# Patient Record
Sex: MALE | Race: WHITE | NOT HISPANIC OR LATINO | Employment: UNEMPLOYED | URBAN - METROPOLITAN AREA
[De-identification: names, ages, dates, MRNs, and addresses within clinical notes are randomized per-mention and may not be internally consistent; named-entity substitution may affect disease eponyms.]

---

## 2017-11-07 ENCOUNTER — OFFICE VISIT (OUTPATIENT)
Dept: SURGERY | Facility: CLINIC | Age: 22
End: 2017-11-07
Payer: COMMERCIAL

## 2017-11-07 VITALS
SYSTOLIC BLOOD PRESSURE: 142 MMHG | BODY MASS INDEX: 25.72 KG/M2 | DIASTOLIC BLOOD PRESSURE: 84 MMHG | WEIGHT: 160.06 LBS | HEIGHT: 66 IN

## 2017-11-07 DIAGNOSIS — K64.5 THROMBOSED EXTERNAL HEMORRHOID: Primary | ICD-10-CM

## 2017-11-07 PROCEDURE — 46083 INC THROMBOSED HROID XTRNL: CPT | Mod: ,,, | Performed by: SURGERY

## 2017-11-07 PROCEDURE — 99204 OFFICE O/P NEW MOD 45 MIN: CPT | Mod: 25,,, | Performed by: SURGERY

## 2017-11-07 RX ORDER — KETOCONAZOLE 20 MG/ML
SHAMPOO, SUSPENSION TOPICAL
Refills: 11 | COMMUNITY
Start: 2017-10-11 | End: 2019-02-05

## 2017-11-07 NOTE — PROGRESS NOTES
Subjective:       Patient ID: Yunior Crawford is a 22 y.o. male.    Chief Complaint: Other (Referred by Dr Cardozo to Evaluate 4cm Thrombosed Hemorrhoid)      HPI:    C/o 2d hx painful lump at anus.  No bleeding.  Denies recent constipation/diarrhea.  No prior episodes    HPI     Other    Additional comments: Referred by Dr Cardozo to Evaluate 4cm Thrombosed   Hemorrhoid       Last edited by Julissa Headley MA on 11/7/2017  1:31 PM. (History)        History reviewed. No pertinent past medical history.  Past Surgical History:   Procedure Laterality Date    CLAVICLE SURGERY       Review of patient's allergies indicates:  No Known Allergies  Medication List with Changes/Refills   Current Medications    KETOCONAZOLE (NIZORAL) 2 % SHAMPOO         History reviewed. No pertinent family history.  Social History     Social History    Marital status: Single     Spouse name: N/A    Number of children: N/A    Years of education: N/A     Social History Main Topics    Smoking status: Current Every Day Smoker     Packs/day: 0.50    Smokeless tobacco: Never Used    Alcohol use Yes      Comment: weekends    Drug use: No    Sexual activity: Not Asked     Other Topics Concern    None     Social History Narrative    None         Review of Systems   Constitutional: Negative for appetite change, chills, fever and unexpected weight change.   HENT: Negative for hearing loss, rhinorrhea, sore throat and voice change.    Eyes: Negative for photophobia and visual disturbance.   Respiratory: Negative for cough, choking and shortness of breath.    Cardiovascular: Negative for chest pain, palpitations and leg swelling.   Gastrointestinal: Positive for rectal pain. Negative for abdominal pain, blood in stool, constipation, diarrhea, nausea and vomiting.   Endocrine: Negative for cold intolerance, heat intolerance and polyphagia.   Genitourinary: Negative for dysuria.   Musculoskeletal: Negative for arthralgias and back pain.   Skin:  Negative for color change.   Neurological: Negative for dizziness, seizures, syncope and headaches.   Hematological: Negative for adenopathy. Does not bruise/bleed easily.       Objective:      Physical Exam   Constitutional: He appears well-developed and well-nourished.  Non-toxic appearance. No distress.   HENT:   Head: Normocephalic and atraumatic. Head is without abrasion and without laceration.   Right Ear: External ear normal.   Left Ear: External ear normal.   Nose: Nose normal.   Mouth/Throat: Oropharynx is clear and moist.   Eyes: EOM are normal. Pupils are equal, round, and reactive to light.   Neck: Trachea normal. No tracheal deviation and normal range of motion present. No thyroid mass and no thyromegaly present.   Cardiovascular: Normal rate and regular rhythm.    Pulmonary/Chest: Effort normal. No accessory muscle usage. No tachypnea. No respiratory distress.   Abdominal: Soft. Normal appearance and bowel sounds are normal. He exhibits no distension and no mass. There is no hepatosplenomegaly. There is no tenderness. There is no tenderness at McBurney's point and negative Johnson's sign. No hernia.   Genitourinary: Rectal exam shows external hemorrhoid (large thrombosed, TTP).   Lymphadenopathy:     He has no cervical adenopathy.   Neurological: He is alert. Coordination and gait normal.   Skin: Skin is warm and intact.   Psychiatric: He has a normal mood and affect. His speech is normal and behavior is normal.       Assessment/Plan:   Thrombosed external hemorrhoid  -     INCISION AND DRAINAGE    Other orders  -     Cancel: INCISION AND DRAINAGE      S/p I&D Thrombosed Hem - see sep report  Rec tub soaks until healed  RTO if needed    Return for F/U - As needed.

## 2017-11-07 NOTE — LETTER
December 28, 2017        Nazia Cardozo MD  [address]  [address]  [facility] [address]             Critical access hospital Surgery  [address]  [address]  [place] [address]  Phone: [phone]  Fax: [phone]   Patient: Yunior Crawford   MR Number: [id number]   YOB: 1995   Date of Visit: 11/7/2017       Dear Dr. Cardozo:    Thank you for referring Yunior Crawford to me for evaluation. Below are the relevant portions of my assessment and plan of care.            If you have questions, please do not hesitate to call me. I look forward to following Yunior along with you.    Sincerely,      Stacy Yoo MD           CC  No Recipients

## 2017-11-08 NOTE — PROCEDURES
"Incision & Drainage  Date/Time: 11/7/2017 3:30 PM  Performed by: CARA SCOTT  Authorized by: CARA SCOTT     Time out: Immediately prior to procedure a "time out" was called to verify the correct patient, procedure, equipment, support staff and site/side marked as required.    Consent Done?:  Yes (Written)    Type:  Hematoma  Body area:  Anogenital  Location details:  Perianal  Anesthesia:  Local infiltration  Local anesthetic:  Lidocaine 1% without epinephrinelidocaine 1% with epinephrine  Anesthetic total (ml):  4  Scalpel size:  11  Incision type:  Single straight  Complexity:  Simple  Drainage:  Bloody  Drainage amount:  Moderate  Wound treatment:  Incision, wound left open and clot removal  Packing material:  None  Patient tolerance:  Patient tolerated the procedure well with no immediate complications    Large amount of clot expressed, no active bleeding      "

## 2018-01-15 ENCOUNTER — HOSPITAL ENCOUNTER (EMERGENCY)
Facility: HOSPITAL | Age: 23
Discharge: HOME | End: 2018-01-15
Attending: EMERGENCY MEDICINE
Payer: COMMERCIAL

## 2018-01-15 ENCOUNTER — TELEPHONE (OUTPATIENT)
Dept: ORTHOPEDICS | Facility: CLINIC | Age: 23
End: 2018-01-15

## 2018-01-15 VITALS
OXYGEN SATURATION: 99 % | DIASTOLIC BLOOD PRESSURE: 87 MMHG | WEIGHT: 175.25 LBS | HEART RATE: 96 BPM | RESPIRATION RATE: 16 BRPM | SYSTOLIC BLOOD PRESSURE: 132 MMHG | TEMPERATURE: 98 F | HEIGHT: 69 IN | BODY MASS INDEX: 25.96 KG/M2

## 2018-01-15 DIAGNOSIS — S61.209A FLEXOR TENDON LACERATION OF FINGER WITH OPEN WOUND, INITIAL ENCOUNTER: Primary | ICD-10-CM

## 2018-01-15 DIAGNOSIS — S56.129A FLEXOR TENDON LACERATION OF FINGER WITH OPEN WOUND, INITIAL ENCOUNTER: Primary | ICD-10-CM

## 2018-01-15 PROCEDURE — 12041 INTMD RPR N-HF/GENIT 2.5CM/<: CPT

## 2018-01-15 PROCEDURE — 99283 EMERGENCY DEPT VISIT LOW MDM: CPT | Mod: 25

## 2018-01-15 RX ORDER — LIDOCAINE HYDROCHLORIDE 10 MG/ML
INJECTION, SOLUTION EPIDURAL; INFILTRATION; INTRACAUDAL; PERINEURAL
Status: DISCONTINUED
Start: 2018-01-15 | End: 2018-01-15 | Stop reason: HOSPADM

## 2018-01-15 RX ORDER — IBUPROFEN 800 MG/1
800 TABLET ORAL EVERY 6 HOURS PRN
Qty: 20 TABLET | Refills: 0 | Status: SHIPPED | OUTPATIENT
Start: 2018-01-15 | End: 2019-02-05

## 2018-01-15 RX ORDER — CEPHALEXIN 500 MG/1
500 CAPSULE ORAL 4 TIMES DAILY
Qty: 28 CAPSULE | Refills: 0 | Status: SHIPPED | OUTPATIENT
Start: 2018-01-15 | End: 2018-01-22

## 2018-01-15 NOTE — TELEPHONE ENCOUNTER
Called pt and advised unfortunately Dr. Galindo does not perform sx on lacerated tendons of the fingers. Provided name and number for Dr. Fried, the orthopedic hand specialist. Pt verbalized understanding.

## 2018-01-15 NOTE — TELEPHONE ENCOUNTER
----- Message from Brittani Vazquez sent at 1/15/2018  1:21 PM CST -----  Contact: mother, bharti Crawford  mother, Bharti Crawford - [phone] is calling.  Mom has some  Specific questions  regarding the  Hand.  The patient has  lacerated his tendons in his fingers. And needs surgery.    Mom needs to know if Dr. Galindo  Does this surgery before she schedules an appointment.  Please advise.  Thanks!

## 2018-01-15 NOTE — ED PROVIDER NOTES
Encounter Date: 1/15/2018       History     Chief Complaint   Patient presents with    Laceration     Rt 4th and 5th digit.  Pt unable to bend finger      This patient is a 22-year-old male presenting to the emergency Department with complaints of lacerations of the index and pinky finger on the right hand.  He reports he was washing dishes tonight when a broken plate cut him at the base of these 2 fingers on the palm aspect.  He denies he is able to flex or bend these fingers.  He reports receiving a tetanus injection within the past 10 years.  He reports he is able to extend the fingers normally and has normal sensation.  He does endorse is been drinking alcohol and is a smoker.  He is right-handed.          Review of patient's allergies indicates:  No Known Allergies  History reviewed. No pertinent past medical history.  Past Surgical History:   Procedure Laterality Date    CLAVICLE SURGERY       History reviewed. No pertinent family history.  Social History   Substance Use Topics    Smoking status: Current Every Day Smoker     Packs/day: 0.50    Smokeless tobacco: Never Used    Alcohol use Yes      Comment: weekends     Review of Systems   Constitutional: Negative for fever.   Respiratory: Negative for shortness of breath.    Cardiovascular: Negative for chest pain.   Gastrointestinal: Negative for abdominal pain.   Musculoskeletal: Positive for myalgias.   Skin: Positive for wound.   Neurological: Positive for weakness.   Hematological: Does not bruise/bleed easily.       Physical Exam     Initial Vitals [01/15/18 0110]   BP Pulse Resp Temp SpO2   132/87 96 16 97.6 °F (36.4 °C) 99 %      MAP       102         Physical Exam    Nursing note and vitals reviewed.  Constitutional: He appears well-nourished. No distress.   Age-appropriate male seen in no acute distress, smells of alcohol   HENT:   Head: Normocephalic.   Eyes: Conjunctivae and EOM are normal.   Neck: Normal range of motion. Neck supple.    Cardiovascular: Normal rate and regular rhythm.   Pulmonary/Chest: No respiratory distress. He has no wheezes.   Abdominal: He exhibits no distension.   Musculoskeletal: He exhibits tenderness. He exhibits no edema.   1.5 similar laceration of the palmar aspect of the proximal right fifth digit, no flexion at the DIP, PIP, MCP, 0.5 cm laceration of the proximal fourth digit, no flexion at the PIP, MCP   Neurological: He is alert and oriented to person, place, and time. No sensory deficit.   Fully intact sensation of the right hand, including the lacerated fingers   Skin: Skin is warm and dry.   Psychiatric: He has a normal mood and affect. Thought content normal.         ED Course   Lac Repair  Date/Time: 1/15/2018 6:30 AM  Performed by: JAROD RIVERA  Authorized by: JAROD RIVERA   Body area: upper extremity  Location details: right hand  Laceration length: 2 cm  Foreign bodies: no foreign bodies  Tendon involvement: complex  Nerve involvement: none  Vascular damage: no  Anesthesia: local infiltration    Anesthesia:  Local Anesthetic: lidocaine 1% without epinephrine  Anesthetic total: 6 mL  Patient sedated: no  Preparation: Patient was prepped and draped in the usual sterile fashion.  Irrigation solution: saline  Irrigation method: syringe  Amount of cleaning: extensive  Debridement: none  Degree of undermining: none  Skin closure: Ethilon  Number of sutures: 5  Technique: simple  Approximation: close  Approximation difficulty: simple  Dressing: antibiotic ointment and splint  Patient tolerance: Patient tolerated the procedure well with no immediate complications        Labs Reviewed - No data to display          Medical Decision Making:   Initial Assessment:   This patient was interviewed and examined.  He has been drinking alcohol but answers appropriately and with normal alertness.  Extensive wound exploration reveals no foreign body.  Concern for complete laceration of the flexor digitorum profundus  and superficialis of the fourth and fifth digit.  Patient had the skin closed and was splinted in slight extension.  He was instructed not to take the splint off until he is assessed by a hand specialist.  He was also educated that evaluation should occur as soon as possible in hopes of securing rapid tendon repair in the OR.  He is asked follow-up at [facility] for this as soon as possible.  He will be discharged with a prescription for Keflex and Naprosyn.  Additionally asked to return to the ER for any new, concerning, or worsening symptoms.  ED Management:  Patient agreeable with this plan for follow-up and he was discharged in stable condition.                   ED Course      Clinical Impression:   The encounter diagnosis was Flexor tendon laceration of finger with open wound, initial encounter.    Disposition:   Disposition: Discharged  Condition: Stable                        Castillo Diaz MD  01/15/18 0635

## 2018-01-15 NOTE — ED NOTES
"Patient here with lacerations to right 4th and 5th fingers. Was washing dishes "vigerously" and plate broke and cut fingers. 1/4" puncture wound to 4th finger and 1/2" laceration to 5th; unable to flex those fingers, can extend them  "

## 2018-01-15 NOTE — TELEPHONE ENCOUNTER
----- Message from Syeda Menon sent at 1/15/2018  2:54 PM CST -----  Contact: Kali Aden Qbsqdr-[phone]   Pt's father called stating the pt injured the tendons in his Rt hand fingers and was told by the doctor in the Emergency room the pt needs to follow up with Dr. Fried so he can have surgery on his hand. The next available appt was in February and the pt's father is requesting the pt to be seen much sooner than that.  Please call father today regarding this [phone]

## 2018-01-16 ENCOUNTER — NURSE TRIAGE (OUTPATIENT)
Dept: ADMINISTRATIVE | Facility: CLINIC | Age: 23
End: 2018-01-16

## 2018-01-16 ENCOUNTER — TELEPHONE (OUTPATIENT)
Dept: ORTHOPEDICS | Facility: CLINIC | Age: 23
End: 2018-01-16

## 2018-01-16 NOTE — TELEPHONE ENCOUNTER
I spoke with the patients mother and informed her that we can not give out medical advise to a new patient. She understood.

## 2018-01-16 NOTE — TELEPHONE ENCOUNTER
Attempted to call pt's mom about questions for wound care. No answer noted. ML with name and contact info.

## 2018-01-16 NOTE — TELEPHONE ENCOUNTER
"Contacted patient father. No answer. Contacted again informed father I was returning phone call from 's office. He yelled at me and stated well you should have that on your caller ID. Advised him that patient has appointment scheduled for Thursday and made aware of time and location. He then stated " We'll be there but don't call again". Verbalized understanding.    ----- Message from Julissa Winter sent at 1/16/2018 10:38 AM CST -----  Contact: Kali His Father / [phone]  Kali the patient father returned your call. Please advise      "

## 2018-01-16 NOTE — TELEPHONE ENCOUNTER
"    Reason for Disposition   [1] Caller requesting NON-URGENT health information AND [2] PCP's office is the best resource    Answer Assessment - Initial Assessment Questions  1. REASON FOR CALL or QUESTION: "What is your reason for calling today?" or "How can I best help you?" or "What question do you have that I can help answer?"      Patient's mother Mrs. Crawford is requesting instructions for wound care for patient's finger injury.    Protocols used: ST INFORMATION ONLY CALL-A-AH      "

## 2018-01-16 NOTE — TELEPHONE ENCOUNTER
----- Message from Mary Mackay sent at 1/16/2018 11:14 AM CST -----  Contact: Self [phone] or [phone]  Patient Returning Your Phone Call

## 2018-01-17 ENCOUNTER — TELEPHONE (OUTPATIENT)
Dept: ORTHOPEDICS | Facility: CLINIC | Age: 23
End: 2018-01-17

## 2018-01-17 NOTE — TELEPHONE ENCOUNTER
----- Message from Mary Mackay sent at 1/17/2018  9:38 AM CST -----  Contact: Self [phone]  Patient is calling to talk to nurse concerning to see if the doctor will still be having clinic tomorrow. Please advice

## 2018-01-17 NOTE — TELEPHONE ENCOUNTER
Spoke with pt's mother informing Dr. Fried's office should be open on tomorrow morning. Understanding verbalized.

## 2018-01-18 ENCOUNTER — TELEPHONE (OUTPATIENT)
Dept: ORTHOPEDICS | Facility: CLINIC | Age: 23
End: 2018-01-18

## 2018-01-18 ENCOUNTER — OFFICE VISIT (OUTPATIENT)
Dept: ORTHOPEDICS | Facility: CLINIC | Age: 23
End: 2018-01-18
Payer: COMMERCIAL

## 2018-01-18 VITALS — HEIGHT: 69 IN | BODY MASS INDEX: 25.92 KG/M2 | WEIGHT: 175 LBS

## 2018-01-18 DIAGNOSIS — S61.401A FLEXOR TENDON LACERATION OF HAND WITH OPEN WOUND, RIGHT, INITIAL ENCOUNTER: ICD-10-CM

## 2018-01-18 DIAGNOSIS — S66.821A FLEXOR TENDON LACERATION OF HAND WITH OPEN WOUND, RIGHT, INITIAL ENCOUNTER: ICD-10-CM

## 2018-01-18 PROCEDURE — 99204 OFFICE O/P NEW MOD 45 MIN: CPT | Mod: S$GLB,,, | Performed by: ORTHOPAEDIC SURGERY

## 2018-01-18 PROCEDURE — 99999 PR PBB SHADOW E&M-EST. PATIENT-LVL III: CPT | Mod: PBBFAC,,, | Performed by: ORTHOPAEDIC SURGERY

## 2018-01-18 RX ORDER — HYDROCODONE BITARTRATE AND ACETAMINOPHEN 5; 325 MG/1; MG/1
1 TABLET ORAL EVERY 4 HOURS PRN
Qty: 30 TABLET | Refills: 0 | Status: SHIPPED | OUTPATIENT
Start: 2018-01-18 | End: 2018-01-28

## 2018-01-18 NOTE — TELEPHONE ENCOUNTER
I spoke with the patient and informed him that we needed him at the hospital for 8am on tomorrow. He was made aware of date, time and location.

## 2018-01-18 NOTE — TELEPHONE ENCOUNTER
Called the numbers provided, one was the incorrect number and another I did not get an answer and was unable to leave a message.

## 2018-01-18 NOTE — PROGRESS NOTES
CHIEF COMPLAINT:  Laceration, right ring and small fingers.    HISTORY OF PRESENT ILLNESS:  A 22-year-old male, sustained laceration to his   right hand four days ago.  He was seen in the emergency room, noted to have   lacerations to the right ring and small fingers involving the tendon, possible   nerve, referred for treatment.  The wounds were closed loosely.    The patient complaining of numbness in the right small finger and limited   movement of the ring and small fingers.    PAST MEDICAL HISTORY:  Unremarkable.    PAST SURGICAL HISTORY:  Includes clavicle surgery.    FAMILY HISTORY:  Negative.    SOCIAL HISTORY:  The patient smokes daily half a pack per day.  Drinks alcohol   on the weekends.    REVIEW OF SYSTEMS:  Negative for fever, chills and rashes.    CURRENT MEDICATIONS:  Reviewed on chart.    ALLERGIES:  None.    PHYSICAL EXAMINATION:  GENERAL:  A well-developed and well-nourished male, in no acute distress, alert   and oriented x3.  HEENT:  Unremarkable.  LUNGS:  Clear to auscultation.  HEART:  Regular rate and rhythm.  ABDOMEN:  Soft and nontender.  EXTREMITIES:  Significant for the right hand demonstrating volar lacerations   near the base of the right ring and small finger.  The small finger has more   extensive laceration, the ring finger almost a puncture wound.  Sutures are in   place for both.  Both fingers are in extended position with no active flexion   possible at the PIP or DIP joints.  There is no evidence of infection.  There is   some bleeding from the small finger.    There is also decreased sensation.  There is decreased sensation in the right   small finger with complete loss distally to the laceration site.  The ring   finger has sensation to be intact.    IMPRESSION:  1.  Flexor tendon laceration x2, right small finger and right ring finger.  2.  Digital nerve laceration x2, right small finger.    PLAN:  I explained the nature of the injury to the patient, recommended surgical    treatment to include a flexor tendon and nerve repair to the ring and small   fingers.  The risks and benefits of surgery were explained to the patient as   well as the need for postop therapy and immobilization and the incomplete nature   of recovery of nerve repairs.  He understands.  He is currently on Keflex.  I   will continue him with the Keflex until surgery next week.  In the meantime,   local wound care, light activities and he was given an ulnar gutter splint for   protection.      SUNG  dd: 01/18/2018 08:33:06 (CST)  td: 01/18/2018 10:10:08 (CST)  Doc ID   #[id number]  Job ID #[id number]    CC:

## 2018-01-18 NOTE — TELEPHONE ENCOUNTER
----- Message from Mona Nuñez sent at 1/18/2018  3:51 PM CST -----  Contact: [phone]/Mother Bharti  Patient's mother is requesting a call back regarding authorization for his procedure at [facility]. Insurance offices are closed today and the surgery is tomorrow. No surgery without authorization. Please call and advise.

## 2018-01-18 NOTE — LETTER
January 18, 2018        Nazia Cardozo MD  [address]  [address]  [facility] [address]             [facility]  [address] [place] [address] [address]  [facility] [address]  Phone: [phone]   Patient: Yunior Crawford   MR Number: [id number]   YOB: 1995   Date of Visit: 1/18/2018       Dear Dr. Cardozo:    Thank you for referring Yunior Crawford to me for evaluation. Below are the relevant portions of my assessment and plan of care.            If you have questions, please do not hesitate to call me. I look forward to following Yunior along with you.    Sincerely,      Kennedy Fried Jr., MD           CC  No Recipients

## 2018-01-18 NOTE — TELEPHONE ENCOUNTER
----- Message from Jenna Schroeder sent at 1/18/2018 10:33 AM CST -----  Contact: mother Hay. [phone] or [phone]  Requests to speak with you regarding insurance pre-authorization for patient's surgery.

## 2018-01-18 NOTE — TELEPHONE ENCOUNTER
The patients mother insisted on rescheduling surgery due to auth. She called her insurance company and they were closed due to the weather. Per dr. Fried we pushed the surgery back to give our auth department time to get auth. She understood and was made aware of new time and location.

## 2018-01-19 ENCOUNTER — ANESTHESIA EVENT (OUTPATIENT)
Dept: SURGERY | Facility: OTHER | Age: 23
End: 2018-01-19

## 2018-01-19 ENCOUNTER — ANESTHESIA (OUTPATIENT)
Dept: SURGERY | Facility: OTHER | Age: 23
End: 2018-01-19

## 2018-01-19 ENCOUNTER — TELEPHONE (OUTPATIENT)
Dept: ORTHOPEDICS | Facility: CLINIC | Age: 23
End: 2018-01-19

## 2018-01-19 NOTE — TELEPHONE ENCOUNTER
Spoke with pt's mother, informed auth dept was called and they will start working on it. Understanding verbalized. Stated will be there for surgery today.

## 2018-01-20 ENCOUNTER — ANESTHESIA EVENT (OUTPATIENT)
Dept: SURGERY | Facility: OTHER | Age: 23
End: 2018-01-20
Payer: COMMERCIAL

## 2018-01-20 ENCOUNTER — SURGERY (OUTPATIENT)
Age: 23
End: 2018-01-20

## 2018-01-20 ENCOUNTER — HOSPITAL ENCOUNTER (OUTPATIENT)
Facility: OTHER | Age: 23
Discharge: HOME | End: 2018-01-20
Attending: ORTHOPAEDIC SURGERY | Admitting: ORTHOPAEDIC SURGERY
Payer: COMMERCIAL

## 2018-01-20 ENCOUNTER — ANESTHESIA (OUTPATIENT)
Dept: SURGERY | Facility: OTHER | Age: 23
End: 2018-01-20
Payer: COMMERCIAL

## 2018-01-20 VITALS
HEART RATE: 69 BPM | OXYGEN SATURATION: 100 % | TEMPERATURE: 98 F | RESPIRATION RATE: 16 BRPM | SYSTOLIC BLOOD PRESSURE: 127 MMHG | DIASTOLIC BLOOD PRESSURE: 82 MMHG

## 2018-01-20 DIAGNOSIS — S66.821A FLEXOR TENDON LACERATION OF HAND WITH OPEN WOUND, RIGHT, INITIAL ENCOUNTER: ICD-10-CM

## 2018-01-20 DIAGNOSIS — S61.401A FLEXOR TENDON LACERATION OF HAND WITH OPEN WOUND, RIGHT, INITIAL ENCOUNTER: ICD-10-CM

## 2018-01-20 PROCEDURE — 37000008 HC ANESTHESIA 1ST 15 MINUTES: Performed by: ORTHOPAEDIC SURGERY

## 2018-01-20 PROCEDURE — 26356 REPAIR FINGER/HAND TENDON: CPT | Mod: 51,F9,, | Performed by: ORTHOPAEDIC SURGERY

## 2018-01-20 PROCEDURE — 25000003 PHARM REV CODE 250: Performed by: ANESTHESIOLOGY

## 2018-01-20 PROCEDURE — 36000706: Performed by: ORTHOPAEDIC SURGERY

## 2018-01-20 PROCEDURE — 37000009 HC ANESTHESIA EA ADD 15 MINS: Performed by: ORTHOPAEDIC SURGERY

## 2018-01-20 PROCEDURE — 25000003 PHARM REV CODE 250: Performed by: ORTHOPAEDIC SURGERY

## 2018-01-20 PROCEDURE — 64831 REPAIR OF DIGIT NERVE: CPT | Mod: 51,F9,, | Performed by: ORTHOPAEDIC SURGERY

## 2018-01-20 PROCEDURE — 63600175 PHARM REV CODE 636 W HCPCS: Performed by: NURSE ANESTHETIST, CERTIFIED REGISTERED

## 2018-01-20 PROCEDURE — 63600175 PHARM REV CODE 636 W HCPCS: Performed by: ANESTHESIOLOGY

## 2018-01-20 PROCEDURE — 36000707: Performed by: ORTHOPAEDIC SURGERY

## 2018-01-20 PROCEDURE — 63600175 PHARM REV CODE 636 W HCPCS: Performed by: ORTHOPAEDIC SURGERY

## 2018-01-20 PROCEDURE — 71000033 HC RECOVERY, INTIAL HOUR: Performed by: ORTHOPAEDIC SURGERY

## 2018-01-20 RX ORDER — CEFAZOLIN SODIUM 2 G/50ML
2 SOLUTION INTRAVENOUS
Status: DISCONTINUED | OUTPATIENT
Start: 2018-01-20 | End: 2018-01-22 | Stop reason: HOSPADM

## 2018-01-20 RX ORDER — OXYCODONE AND ACETAMINOPHEN 7.5; 325 MG/1; MG/1
1 TABLET ORAL EVERY 4 HOURS PRN
Qty: 40 TABLET | Refills: 0 | Status: SHIPPED | OUTPATIENT
Start: 2018-01-20 | End: 2018-02-01 | Stop reason: SDUPTHER

## 2018-01-20 RX ORDER — SODIUM CHLORIDE, SODIUM LACTATE, POTASSIUM CHLORIDE, CALCIUM CHLORIDE 600; 310; 30; 20 MG/100ML; MG/100ML; MG/100ML; MG/100ML
INJECTION, SOLUTION INTRAVENOUS CONTINUOUS PRN
Status: DISCONTINUED | OUTPATIENT
Start: 2018-01-20 | End: 2018-01-20

## 2018-01-20 RX ORDER — CEPHALEXIN 500 MG/1
500 CAPSULE ORAL 4 TIMES DAILY
Qty: 20 CAPSULE | Refills: 0 | Status: SHIPPED | OUTPATIENT
Start: 2018-01-20 | End: 2018-02-26

## 2018-01-20 RX ORDER — ONDANSETRON 2 MG/ML
INJECTION INTRAMUSCULAR; INTRAVENOUS
Status: DISCONTINUED | OUTPATIENT
Start: 2018-01-20 | End: 2018-01-20

## 2018-01-20 RX ORDER — ACETAMINOPHEN 325 MG/1
650 TABLET ORAL EVERY 4 HOURS PRN
Status: DISCONTINUED | OUTPATIENT
Start: 2018-01-20 | End: 2018-01-22 | Stop reason: HOSPADM

## 2018-01-20 RX ORDER — SODIUM CHLORIDE 0.9 % (FLUSH) 0.9 %
3 SYRINGE (ML) INJECTION
Status: DISCONTINUED | OUTPATIENT
Start: 2018-01-20 | End: 2018-01-22 | Stop reason: HOSPADM

## 2018-01-20 RX ORDER — FENTANYL CITRATE 50 UG/ML
INJECTION, SOLUTION INTRAMUSCULAR; INTRAVENOUS
Status: DISCONTINUED | OUTPATIENT
Start: 2018-01-20 | End: 2018-01-20

## 2018-01-20 RX ORDER — ONDANSETRON 2 MG/ML
4 INJECTION INTRAMUSCULAR; INTRAVENOUS DAILY PRN
Status: DISCONTINUED | OUTPATIENT
Start: 2018-01-20 | End: 2018-01-22 | Stop reason: HOSPADM

## 2018-01-20 RX ORDER — BACITRACIN 50000 [IU]/1
INJECTION, POWDER, FOR SOLUTION INTRAMUSCULAR
Status: DISCONTINUED | OUTPATIENT
Start: 2018-01-20 | End: 2018-01-20 | Stop reason: HOSPADM

## 2018-01-20 RX ORDER — MIDAZOLAM HYDROCHLORIDE 1 MG/ML
INJECTION INTRAMUSCULAR; INTRAVENOUS
Status: DISCONTINUED | OUTPATIENT
Start: 2018-01-20 | End: 2018-01-20

## 2018-01-20 RX ORDER — PROPOFOL 10 MG/ML
VIAL (ML) INTRAVENOUS
Status: DISCONTINUED | OUTPATIENT
Start: 2018-01-20 | End: 2018-01-20

## 2018-01-20 RX ORDER — KETOROLAC TROMETHAMINE 30 MG/ML
30 INJECTION, SOLUTION INTRAMUSCULAR; INTRAVENOUS ONCE
Status: COMPLETED | OUTPATIENT
Start: 2018-01-20 | End: 2018-01-20

## 2018-01-20 RX ORDER — ONDANSETRON 8 MG/1
8 TABLET, ORALLY DISINTEGRATING ORAL EVERY 8 HOURS PRN
Status: DISCONTINUED | OUTPATIENT
Start: 2018-01-20 | End: 2018-01-22 | Stop reason: HOSPADM

## 2018-01-20 RX ORDER — MEPERIDINE HYDROCHLORIDE 50 MG/ML
12.5 INJECTION INTRAMUSCULAR; INTRAVENOUS; SUBCUTANEOUS ONCE AS NEEDED
Status: ACTIVE | OUTPATIENT
Start: 2018-01-20 | End: 2018-01-20

## 2018-01-20 RX ORDER — OXYCODONE HYDROCHLORIDE 5 MG/1
10 TABLET ORAL EVERY 4 HOURS PRN
Status: DISCONTINUED | OUTPATIENT
Start: 2018-01-20 | End: 2018-01-22 | Stop reason: HOSPADM

## 2018-01-20 RX ORDER — HYDROMORPHONE HYDROCHLORIDE 2 MG/ML
0.4 INJECTION, SOLUTION INTRAMUSCULAR; INTRAVENOUS; SUBCUTANEOUS EVERY 5 MIN PRN
Status: DISCONTINUED | OUTPATIENT
Start: 2018-01-20 | End: 2018-01-22 | Stop reason: HOSPADM

## 2018-01-20 RX ORDER — ROPIVACAINE HYDROCHLORIDE 5 MG/ML
INJECTION, SOLUTION EPIDURAL; INFILTRATION; PERINEURAL
Status: DISCONTINUED | OUTPATIENT
Start: 2018-01-20 | End: 2018-01-20

## 2018-01-20 RX ORDER — LIDOCAINE HCL/PF 100 MG/5ML
SYRINGE (ML) INTRAVENOUS
Status: DISCONTINUED | OUTPATIENT
Start: 2018-01-20 | End: 2018-01-20

## 2018-01-20 RX ORDER — ACETAMINOPHEN 10 MG/ML
INJECTION, SOLUTION INTRAVENOUS
Status: DISCONTINUED | OUTPATIENT
Start: 2018-01-20 | End: 2018-01-20

## 2018-01-20 RX ORDER — OXYCODONE HYDROCHLORIDE 5 MG/1
5 TABLET ORAL
Status: DISCONTINUED | OUTPATIENT
Start: 2018-01-20 | End: 2018-01-22 | Stop reason: HOSPADM

## 2018-01-20 RX ORDER — FENTANYL CITRATE 50 UG/ML
25 INJECTION, SOLUTION INTRAMUSCULAR; INTRAVENOUS EVERY 5 MIN PRN
Status: DISCONTINUED | OUTPATIENT
Start: 2018-01-20 | End: 2018-01-22 | Stop reason: HOSPADM

## 2018-01-20 RX ORDER — PROPOFOL 10 MG/ML
VIAL (ML) INTRAVENOUS CONTINUOUS PRN
Status: DISCONTINUED | OUTPATIENT
Start: 2018-01-20 | End: 2018-01-20

## 2018-01-20 RX ADMIN — ROPIVACAINE HYDROCHLORIDE 30 ML: 5 INJECTION, SOLUTION EPIDURAL; INFILTRATION; PERINEURAL at 09:01

## 2018-01-20 RX ADMIN — PROPOFOL 50 MG: 10 INJECTION, EMULSION INTRAVENOUS at 09:01

## 2018-01-20 RX ADMIN — MIDAZOLAM HYDROCHLORIDE 2 MG: 1 INJECTION, SOLUTION INTRAMUSCULAR; INTRAVENOUS at 09:01

## 2018-01-20 RX ADMIN — ACETAMINOPHEN 1000 MG: 10 INJECTION, SOLUTION INTRAVENOUS at 09:01

## 2018-01-20 RX ADMIN — FENTANYL CITRATE 100 MCG: 50 INJECTION, SOLUTION INTRAMUSCULAR; INTRAVENOUS at 09:01

## 2018-01-20 RX ADMIN — ONDANSETRON 4 MG: 2 INJECTION INTRAMUSCULAR; INTRAVENOUS at 09:01

## 2018-01-20 RX ADMIN — BACITRACIN 50000 UNITS: 50000 INJECTION, POWDER, FOR SOLUTION INTRAMUSCULAR at 10:01

## 2018-01-20 RX ADMIN — OXYCODONE HYDROCHLORIDE 5 MG: 5 TABLET ORAL at 12:01

## 2018-01-20 RX ADMIN — LIDOCAINE HYDROCHLORIDE 75 MG: 20 INJECTION, SOLUTION INTRAVENOUS at 09:01

## 2018-01-20 RX ADMIN — PROPOFOL 100 MCG/KG/MIN: 10 INJECTION, EMULSION INTRAVENOUS at 09:01

## 2018-01-20 RX ADMIN — KETOROLAC TROMETHAMINE 30 MG: 30 INJECTION, SOLUTION INTRAMUSCULAR at 12:01

## 2018-01-20 RX ADMIN — CEFAZOLIN SODIUM 2 G: 2 SOLUTION INTRAVENOUS at 09:01

## 2018-01-20 RX ADMIN — SODIUM CHLORIDE, SODIUM LACTATE, POTASSIUM CHLORIDE, AND CALCIUM CHLORIDE: 600; 310; 30; 20 INJECTION, SOLUTION INTRAVENOUS at 09:01

## 2018-01-20 NOTE — OP NOTE
DATE OF PROCEDURE:  01/20/2018.    PREOPERATIVE DIAGNOSES:  1.  Flexor tendon laceration x2, right ring finger (flexor digitorum profundus,   flexor digitorum superficialis).  2.  Flexor tendon laceration x2, right small finger (flexor digitorum profundus,   flexor digitorum superficialis).  3.  Ulnar digital nerve laceration, right small finger.    POSTOPERATIVE DIAGNOSES:  1.  Flexor tendon laceration x2, right ring finger (flexor digitorum profundus,   flexor digitorum superficialis).  2.  Flexor tendon laceration x2, right small finger (flexor digitorum profundus,   flexor digitorum superficialis).  3.  Ulnar digital nerve laceration, right small finger.    OPERATIVE PROCEDURES:  1.  Flexor tendon repair x2, right ring finger, zone 2 (FDP, FDS tendon).  2.  Flexor tendon repair x2, right small finger zone 2 (FDP, FDS tendon).  3.  Repair of ulnar digital nerve, right small finger using micro instruments   and 8-0 nylon.    SURGEON:  Dr. Kennedy Fried.    ANESTHESIA:  Regional block.    ESTIMATED BLOOD LOSS:  Minimal.    COMPLICATIONS:  None.    SPECIMENS:  None.    BRIEF INDICATIONS:  This is a 22-year-old male who sustained laceration to his   right ring and small finger with tendon and nerve injury, taken to Surgery for   the above procedure.    OPERATIVE PROCEDURE IN DETAIL:  After operative consent was obtained, the   patient brought to the Operating Room, placed supine on the operating room   table.  Anesthesia by regional block was performed by the Anesthesia staff.    After the right arm was fully anesthetized, the tourniquet applied to the arm   and the right upper extremity prepped and draped out in the normal sterile   fashion.  The Esmarch used to exsanguinate the limb and the tourniquet inflated   to 225 mmHg.    Sutures were removed from both fingers at the laceration site volar at the PIP   joint.  Sutures were removed.  The wounds were opened and irrigated thoroughly.    The ring finger was  approached first with a zigzag extension using skin creases   in Reinier incisions with a #15 blade.  Full-thickness skin flaps were elevated.    Hemostasis achieved with the Bovie.  The nerves were intact in the ring   finger.  Both tendons were cut and retracted to the level of the A1 pulley.  A   tendon grabber was used to retrieve the two tendons, which were then brought   into the wound, irrigated thoroughly and the ends cleaned up a bit.  The FDS was   repaired first using several 2 or 3 horizontal mattress sutures using 4-0   FiberWire.  Prolene used to bury the knots.  In a similar fashion, the FDP   tendon was repaired in a first modified Garcia suture of 4-0 FiberWire   augmented with a horizontal mattress on each side and 6-0 Prolene to bury the   knots.  Good repair was achieved for both, wound thoroughly irrigated with   antibiotic saline solution.  Hemostasis achieved with the Bovie and the skin   closed with running and interrupted 5-0 nylon horizontal mattress suture.    Attention was then turned to the right small finger where the initial incision   was extended proximally and distally on each side and utilizing skin creases.    In this case, there was an ulnar digital nerve laceration, so the ulnar digital   nerve was exposed on both sides.  Full thickness skin flaps were elevated,   hemostasis achieved with the Bovie.  The 2 flexor tendons were retracted into   the palm, so the skin incision had to be extended into the distal palm where   they were retrieved at the level of the A1 pulley then passed underneath the A2   pulley delivered into the initial area of injury and then repaired.  In a   similar fashion with horizontal mattress sutures for the FDS using 4-0 FiberWire   and modified Garcia augmented with horizontal mattress sutures x2 on the FDP   and then suture knots buried with 6-0 Prolene.  Good repair was achieved for   both, the A3 pulley left open, the A2 and A4 pulleys both intact.   The wound   irrigated thoroughly.  Attention was then turned to the ulnar digital nerve,   which was repaired with 8-0 nylon epineurial sutures circumferentially.  Good   repair achieved.  The wound irrigated and closed using interrupted and running   5-0 nylon horizontal mattress suture.  Sterile dressing was applied followed by   a well-padded dorsal durham splint with the fingers flexed down.  Tourniquet   deflated.  The patient was brought to Recovery Room in stable condition.  All   sponge and needle counts reported as correct.  No complications.      MIKE/JASON  dd: 01/20/2018 12:19:14 (CST)  td: 01/20/2018 13:04:37 (CST)  Doc ID   #[id number]  Job ID #[id number]    CC:

## 2018-01-20 NOTE — INTERVAL H&P NOTE
The patient has been examined and the H&P has been reviewed:    I concur with the findings and no changes have occurred since H&P was written.    Anesthesia/Surgery risks, benefits and alternative options discussed and understood by patient/family.          Active Hospital Problems    Diagnosis  POA    Flexor tendon laceration of hand with open wound, right, initial encounter [S37.920F, J41.460K]  Yes      Resolved Hospital Problems    Diagnosis Date Resolved POA   No resolved problems to display.

## 2018-01-20 NOTE — ANESTHESIA POSTPROCEDURE EVALUATION
Anesthesia Post Evaluation    Patient: Yunior Crawford    Procedure(s) Performed: Procedure(s) (LRB):  REPAIR-FLEXOR TENDON - right -  ring and small finger (Right)  REPAIR-NERVE-FINGER- right  small finger (Right)    Final Anesthesia Type: regional  Patient location during evaluation: PACU  Patient participation: Yes- Able to Participate  Level of consciousness: awake and alert  Post-procedure vital signs: reviewed and stable  Pain management: adequate  Airway patency: patent  PONV status at discharge: No PONV  Anesthetic complications: no      Cardiovascular status: blood pressure returned to baseline  Respiratory status: unassisted and spontaneous ventilation  Hydration status: euvolemic  Follow-up not needed.        Visit Vitals  /82   Pulse 69   Temp 36.7 °C (98 °F) (Oral)   Resp 16   SpO2 100%       Pain/Amanda Score: Pain Assessment Performed: Yes (1/20/2018 12:11 PM)  Presence of Pain: denies (1/20/2018 12:44 PM)  Pain Rating Prior to Med Admin: 0 (1/20/2018 12:44 PM)  Pain Rating Post Med Admin: 0 (1/20/2018 12:44 PM)  Amanda Score: 8 (1/20/2018 12:11 PM)

## 2018-01-20 NOTE — ANESTHESIA PREPROCEDURE EVALUATION
01/20/2018  Yunior Crawford is a 22 y.o., male.    Anesthesia Evaluation    I have reviewed the Patient Summary Reports.    I have reviewed the Nursing Notes.   I have reviewed the Medications.     Review of Systems  Anesthesia Hx:  Denies Family Hx of Anesthesia complications.   Denies Personal Hx of Anesthesia complications.   Social:  Smoker    Hematology/Oncology:  Hematology Normal   Oncology Normal     EENT/Dental:EENT/Dental Normal   Cardiovascular:  Cardiovascular Normal     Pulmonary:  Pulmonary Normal    Renal/:  Renal/ Normal     Hepatic/GI:  Hepatic/GI Normal    Musculoskeletal:  Musculoskeletal Normal    Neurological:  Neurology Normal    Endocrine:  Endocrine Normal    Dermatological:  Skin Normal    Psych:  Psychiatric Normal           Physical Exam  General:  Well nourished    Airway/Jaw/Neck:  Airway Findings: Mouth Opening: Normal Tongue: Normal  General Airway Assessment: Adult  Mallampati: II  TM Distance: Normal, at least 6 cm      Dental:  Dental Findings: In tact, molar caps, upper front caps        Mental Status:  Mental Status Findings:  Alert and Oriented, Cooperative         Anesthesia Plan  Type of Anesthesia, risks & benefits discussed:  Anesthesia Type:  regional  Patient's Preference:   Intra-op Monitoring Plan: standard ASA monitors  Intra-op Monitoring Plan Comments:   Post Op Pain Control Plan:   Post Op Pain Control Plan Comments:   Induction:    Beta Blocker:         Informed Consent: Patient understands risks and agrees with Anesthesia plan.  Questions answered. Anesthesia consent signed with patient.  ASA Score: 1     Day of Surgery Review of History & Physical:    H&P update referred to the surgeon.         Ready For Surgery From Anesthesia Perspective.

## 2018-01-20 NOTE — ANESTHESIA PROCEDURE NOTES
Peripheral    Patient location during procedure: holding area    Reason for block: primary anesthetic   Diagnosis: laceration of finger    End time: 1/20/2018 9:16 AM  Staffing  Anesthesiologist: ZACK ZARAGOZA  Preanesthetic Checklist  Completed: patient identified, site marked, surgical consent, pre-op evaluation, timeout performed, IV checked, risks and benefits discussed and monitors and equipment checked  Peripheral Block  Patient position: supine  Prep: ChloraPrep  Patient monitoring: heart rate, cardiac monitor, continuous pulse ox and frequent blood pressure checks  Block type: axillary  Laterality: right  Injection technique: single shot  Needle  Needle type: Stimuplex   Needle gauge: 22 G  Needle length: 4 in  Needle localization: nerve stimulator and ultrasound guidance   -ultrasound image captured on disc.  Assessment  Injection assessment: negative aspiration, negative parasthesia and local visualized surrounding nerve  Paresthesia pain: none  Slow fractionated injection: yes  Medications:  Bolus administered: 30 mL of 0.5 ropivacaine  Epinephrine added: none

## 2018-01-20 NOTE — BRIEF OP NOTE
[facility]  Brief Operative Note     SUMMARY     Surgery Date: 1/20/2018     Surgeon(s) and Role:     * Kennedy Fried Jr., MD - Primary    Assisting Surgeon: None    Pre-op Diagnosis:  Flexor tendon laceration of right hand with open wound, initial encounter [S66.821A, S61.409A]  Open wound of hand except fingers without complication [S61.409A]    Post-op Diagnosis:  Post-Op Diagnosis Codes:     * Flexor tendon laceration of right hand with open wound, initial encounter [S66.821A, S61.409A]     * Open wound of hand except fingers without complication [S61.409A]    Procedure(s) (LRB):  REPAIR-FLEXOR TENDON - right -  ring and small finger (Right)  REPAIR-NERVE-FINGER- right  small finger (Right)    Anesthesia: General    Description of the findings of the procedure: tendon and nerve repair right hand    Findings/Key Components: tendon and nerve lac right hand    Estimated Blood Loss: * No values recorded between 1/20/2018  9:58 AM and 1/20/2018 12:13 PM *         Specimens:   Specimen (12h ago through future)    None          Discharge Note    SUMMARY     Admit Date: 1/20/2018    Discharge Date and Time:  01/20/2018 12:14 PM    Hospital Course (synopsis of major diagnoses, care, treatment, and services provided during the course of the hospital stay): see op note     Final Diagnosis: Post-Op Diagnosis Codes:     * Flexor tendon laceration of right hand with open wound, initial encounter [S66.821A, S61.409A]     * Open wound of hand except fingers without complication [S61.409A]    Disposition: Home or Self Care    Follow Up/Patient Instructions:     Medications:  Reconciled Home Medications:   Current Discharge Medication List      START taking these medications    Details   !! cephALEXin (KEFLEX) 500 MG capsule Take 1 capsule (500 mg total) by mouth 4 (four) times daily.  Qty: 20 capsule, Refills: 0      oxyCODONE-acetaminophen (PERCOCET) 7.5-325 mg per tablet Take 1 tablet by mouth every 4  (four) hours as needed for Pain.  Qty: 40 tablet, Refills: 0       !! - Potential duplicate medications found. Please discuss with provider.      CONTINUE these medications which have NOT CHANGED    Details   !! cephALEXin (KEFLEX) 500 MG capsule Take 1 capsule (500 mg total) by mouth 4 (four) times daily.  Qty: 28 capsule, Refills: 0      hydrocodone-acetaminophen 5-325mg (NORCO) 5-325 mg per tablet Take 1 tablet by mouth every 4 (four) hours as needed for Pain.  Qty: 30 tablet, Refills: 0      ibuprofen (ADVIL,MOTRIN) 800 MG tablet Take 1 tablet (800 mg total) by mouth every 6 (six) hours as needed for Pain.  Qty: 20 tablet, Refills: 0      ketoconazole (NIZORAL) 2 % shampoo Refills: 11       !! - Potential duplicate medications found. Please discuss with provider.          Discharge Procedure Orders  Diet general     Shower on day dressing removed (No bath)     Call MD for:  temperature >100.4     Call MD for:  persistent nausea and vomiting     Call MD for:  severe uncontrolled pain     Leave dressing on - Keep it clean, dry, and intact until clinic visit     Keep surgical extremity elevated       Follow-up Information     Kennedy Fried Jr, MD. Schedule an appointment as soon as possible for a visit in 10 days.    Specialties:  Hand Surgery, Orthopedic Surgery  Why:  For suture removal  Contact information:  [address]  [address]  [place] [address] [phone]

## 2018-01-20 NOTE — H&P (VIEW-ONLY)
CHIEF COMPLAINT:  Laceration, right ring and small fingers.    HISTORY OF PRESENT ILLNESS:  A 22-year-old male, sustained laceration to his   right hand four days ago.  He was seen in the emergency room, noted to have   lacerations to the right ring and small fingers involving the tendon, possible   nerve, referred for treatment.  The wounds were closed loosely.    The patient complaining of numbness in the right small finger and limited   movement of the ring and small fingers.    PAST MEDICAL HISTORY:  Unremarkable.    PAST SURGICAL HISTORY:  Includes clavicle surgery.    FAMILY HISTORY:  Negative.    SOCIAL HISTORY:  The patient smokes daily half a pack per day.  Drinks alcohol   on the weekends.    REVIEW OF SYSTEMS:  Negative for fever, chills and rashes.    CURRENT MEDICATIONS:  Reviewed on chart.    ALLERGIES:  None.    PHYSICAL EXAMINATION:  GENERAL:  A well-developed and well-nourished male, in no acute distress, alert   and oriented x3.  HEENT:  Unremarkable.  LUNGS:  Clear to auscultation.  HEART:  Regular rate and rhythm.  ABDOMEN:  Soft and nontender.  EXTREMITIES:  Significant for the right hand demonstrating volar lacerations   near the base of the right ring and small finger.  The small finger has more   extensive laceration, the ring finger almost a puncture wound.  Sutures are in   place for both.  Both fingers are in extended position with no active flexion   possible at the PIP or DIP joints.  There is no evidence of infection.  There is   some bleeding from the small finger.    There is also decreased sensation.  There is decreased sensation in the right   small finger with complete loss distally to the laceration site.  The ring   finger has sensation to be intact.    IMPRESSION:  1.  Flexor tendon laceration x2, right small finger and right ring finger.  2.  Digital nerve laceration x2, right small finger.    PLAN:  I explained the nature of the injury to the patient, recommended surgical    treatment to include a flexor tendon and nerve repair to the ring and small   fingers.  The risks and benefits of surgery were explained to the patient as   well as the need for postop therapy and immobilization and the incomplete nature   of recovery of nerve repairs.  He understands.  He is currently on Keflex.  I   will continue him with the Keflex until surgery next week.  In the meantime,   local wound care, light activities and he was given an ulnar gutter splint for   protection.      SUNG  dd: 01/18/2018 08:33:06 (CST)  td: 01/18/2018 10:10:08 (CST)  Doc ID   #[id number]  Job ID #[id number]    CC:

## 2018-01-20 NOTE — TRANSFER OF CARE
Anesthesia Transfer of Care Note    Patient: Yunior Crawford    Procedure(s) Performed: Procedure(s) (LRB):  REPAIR-FLEXOR TENDON - right -  ring and small finger (Right)  REPAIR-NERVE-FINGER- right  small finger (Right)    Patient location: PACU    Anesthesia Type: general    Transport from OR: Transported from OR on 2-3 L/min O2 by NC with adequate spontaneous ventilation    Post pain: adequate analgesia    Post assessment: no apparent anesthetic complications and tolerated procedure well    Post vital signs: stable    Level of consciousness: awake    Complications: none    Transfer of care protocol was followed      Last vitals:   Visit Vitals  /82   Pulse 79   Temp 36.7 °C (98 °F) (Oral)   Resp 16   SpO2 100%

## 2018-02-01 ENCOUNTER — OFFICE VISIT (OUTPATIENT)
Dept: ORTHOPEDICS | Facility: CLINIC | Age: 23
End: 2018-02-01
Payer: COMMERCIAL

## 2018-02-01 DIAGNOSIS — S66.821A FLEXOR TENDON LACERATION OF HAND WITH OPEN WOUND, RIGHT, INITIAL ENCOUNTER: Primary | ICD-10-CM

## 2018-02-01 DIAGNOSIS — S61.401A FLEXOR TENDON LACERATION OF HAND WITH OPEN WOUND, RIGHT, INITIAL ENCOUNTER: Primary | ICD-10-CM

## 2018-02-01 PROCEDURE — 99999 PR PBB SHADOW E&M-EST. PATIENT-LVL II: CPT | Mod: PBBFAC,,, | Performed by: ORTHOPAEDIC SURGERY

## 2018-02-01 PROCEDURE — 99024 POSTOP FOLLOW-UP VISIT: CPT | Mod: S$GLB,,, | Performed by: ORTHOPAEDIC SURGERY

## 2018-02-01 RX ORDER — OXYCODONE AND ACETAMINOPHEN 7.5; 325 MG/1; MG/1
1 TABLET ORAL EVERY 12 HOURS PRN
Qty: 40 TABLET | Refills: 0 | Status: SHIPPED | OUTPATIENT
Start: 2018-02-01 | End: 2018-07-10

## 2018-02-01 RX ORDER — TRAMADOL HYDROCHLORIDE 50 MG/1
50 TABLET ORAL EVERY 6 HOURS PRN
Qty: 40 TABLET | Refills: 0 | Status: SHIPPED | OUTPATIENT
Start: 2018-02-01 | End: 2018-02-11

## 2018-02-01 NOTE — PROGRESS NOTES
HISTORY OF PRESENT ILLNESS:  Mr. Crawford is about 12 days' postop tendon nerve   repair of the right ring and small finger.  Unfortunately, he took his splint   off at home and has sort of been keeping it wrapped for the past 3-4 days.    PHYSICAL EXAMINATION:  The incisions are healing well, ring and small finger   looked healed.  Sutures are in place.  Slight swelling and minimal tenderness.    He has pretty good posture to the ring and small finger, does not appear to be   re-ruptured at this point, limited active flexion, however.    Passive motion is better.    PLAN:  Again, I stressed that I had over the phone that if he takes his splint   off, he could easily re-ruptured the tendons and ruining the repair.  I hope   this made an impression on him.  Certainly no lifting, gripping or use of the   hand.    We will order some therapy in [place] try to get him set up as soon as possible   for a dorsal splint.  In the meantime, we will apply a dorsal durham splint today   with strict instructions not to remove it.    I have given him some Percocet for pain.  Follow up in three weeks.      SUNG  dd: 02/01/2018 10:40:53 (CST)  td: 02/02/2018 06:21:19 (CST)  Doc ID   #[id number]  Job ID #[id number]    CC:

## 2018-02-02 ENCOUNTER — CLINICAL SUPPORT (OUTPATIENT)
Dept: REHABILITATION | Facility: HOSPITAL | Age: 23
End: 2018-02-02
Attending: ORTHOPAEDIC SURGERY
Payer: COMMERCIAL

## 2018-02-02 DIAGNOSIS — M25.641 STIFFNESS OF RIGHT HAND JOINT: ICD-10-CM

## 2018-02-02 DIAGNOSIS — R29.898 RIGHT HAND WEAKNESS: ICD-10-CM

## 2018-02-02 DIAGNOSIS — M25.541 JOINT PAIN IN FINGERS OF RIGHT HAND: Primary | ICD-10-CM

## 2018-02-02 DIAGNOSIS — M25.441 HAND JOINT EFFUSION, RIGHT: ICD-10-CM

## 2018-02-02 PROCEDURE — 97760 ORTHOTIC MGMT&TRAING 1ST ENC: CPT | Mod: PN

## 2018-02-02 PROCEDURE — 97110 THERAPEUTIC EXERCISES: CPT | Mod: PN

## 2018-02-02 NOTE — PROGRESS NOTES
In basket message returned on 2-1-18 from [facility] indicating forearm based splint including lf thru sf is what me would like    Time in 2  Time out 345      Timed units  6 Orthotic fit and train                               Time:2-330  1 therex                                                  Time:330-345      S:understands splint purpose, current restrictions on hand use  Pain:mild, diffuse, intermittent at rf and sf    O:  Removed plaster of magdalena forearm based dorsal block for rf and sf    Cleaned hand with saline    Fabricated thermoplast forearm based dbs with wrist at about 20 pf and mcps at about 70 flex    Issued and practiced active ip ext 2 thru 5 with mcp joints passively held in full flex, (index included since pt. Reported not wearing post op dressing); the intention of splinting if is to discourage any use of hand with incidental force transmission into affected digits)    Issued and practiced passive composite flexion stretches for rf and sf    Told to wear splint 24/7 except for hygiene (sit at table) but to leave wrist and hand is same position out of splint as in splint    Told pt. To do nothing with hand except what was issued today    Told pt. To avoid nicotine and caffeine as much as possible since they inhibit tendon healing        A: skilled and structured rehab imperative to discourage contractures, promote proper scar maturation, facilitate good tendon pullthrough, and to foster functional use of r hand            P:do eval

## 2018-02-06 ENCOUNTER — TELEPHONE (OUTPATIENT)
Dept: REHABILITATION | Facility: HOSPITAL | Age: 23
End: 2018-02-06

## 2018-02-08 ENCOUNTER — CLINICAL SUPPORT (OUTPATIENT)
Dept: REHABILITATION | Facility: HOSPITAL | Age: 23
End: 2018-02-08
Attending: ORTHOPAEDIC SURGERY
Payer: COMMERCIAL

## 2018-02-08 DIAGNOSIS — M25.641 STIFFNESS OF RIGHT HAND JOINT: ICD-10-CM

## 2018-02-08 DIAGNOSIS — R29.898 RIGHT HAND WEAKNESS: ICD-10-CM

## 2018-02-08 DIAGNOSIS — M25.541 JOINT PAIN IN FINGERS OF RIGHT HAND: Primary | ICD-10-CM

## 2018-02-08 DIAGNOSIS — M25.441 HAND JOINT EFFUSION, RIGHT: ICD-10-CM

## 2018-02-08 PROCEDURE — 97165 OT EVAL LOW COMPLEX 30 MIN: CPT | Mod: PN

## 2018-02-08 NOTE — PLAN OF CARE
Date: 2-8-18    Time in: 5  Time out: 6    Procedures: eval  Start: 5  Stop: 6    Total untimed minutes: eval  Charges billed # of units: 1    Onset date: 1-15-18  Primary dx: r sf ulna nerve repair, fds and fdp repair; rf fds and fdp repair  Tx dx: r hand joint pain, stiffness, effusion; hand weakness    Pmhx relevant to primary or tx dx: n/a    Precautions: universal, general flexor tendon healing guidelines  Prior therapy: dbs, HEP (see previous note)  Medications relevant to primary or tx dx: n/a  Nutrition: wnl  Hx of present illness: cut hand on knife, er same day where skin sutured, had repairs done 1-20-18  PLOF: full use with all required tasks  Social hx: smokes cigarettes and drinks caffeine daily  Fxnl deficits leading to referral: decreased rom, use, and strength with ADL  Patient therapy goals: full painless use    Subjective    Patient states: understanding of HEP importance and general anticipated progression of therapy. Pt. Understands to follow instructions precisely due to nature of surgery.    Pain: mild at rest and with HEP    Objective    Posture: in dbs, slight edema at rf and sf, healed incisions, dry skin noted at rf and sf  Palpation: mild numbness at sf  Sensation: see above  ROM tested in splint: prom composite flex for rf and sf 1 cm to dpc, very mild intrinsic tightness noted at rf and sf in splint, ip passive ext 100% except about a 10 degree lag at rf and sf pip  Edema: mild at rf and sf  ADL: not using hand for anything a this point  Hand dominance: r  Job: cleaning service  Duties:per job; Normal self and home care tasks  Tx: stretches per HEP, issued and performed scar massage    Assessment    Initial assessment (pertinent findings, problem list and factors affecting outcome): Needs skilled OT to properly promote rom, use, and strength given type, nature, and extent of diagnosis  Rehab potential: good    Goals:     stg 1. Pt. Will be I with HEP 2. Pt. Will have 2/10 pain with light  use 3. Pt. Will have = prom of bilateral 4th and 5th digits To enhance affected arm use with ADL      ltg 1. Pt. Will be I with d/c HEP 2. Pt. Will have 1/10 pain with all use 3. Pt. Will have wfl arom rf and sf to facilitate use with ADL                                                                          4. Pt. Will have roughly 75%  force vs unaffected side to improve ADL independence    Plan    Certification period: 2-2-18 to 7-11-18  Recommended tx plan: 3 times a week for 24 weeks; eval and tx  Other recommendations: above visit frequency and duration in above dates may be adjusted based on pt. progress and need for therapy    Therapist: bettina simmons c[facility]      eval code grid  Profile and History Assessment of Occupational Performance Level of Clinical Decision Making Complexity Score   Occupational Profile:   Yunior Crawford is a 22 y.o. male who lives with their family and is currently employed as cleaning . Yunior Crawford has difficulty with  All use due to protocol restrictions  affecting his/her daily functional abilities. His/her main goal for therapy is full painless use.     Comorbidities:   Caffeine and nicotine    Medical and Therapy History Review:   Brief               Performance Deficits    Physical:  Joint Mobility  Muscle Power/Strength  Muscle Endurance  Skin Integrity/Scar Formation  Edema   Strength    Cognitive:  No Deficits    Psychosocial:    No Deficits     Clinical Decision Making:  low    Assessment Process:  Problem-Focused Assessments    Modification/Need for Assistance:  Not Necessary    Intervention Selection:  Limited Treatment Options       low  Based on PMHX, co morbidities , data from assessments and functional level of assistance required with task and clinical presentation directly impacting function.

## 2018-02-12 ENCOUNTER — CLINICAL SUPPORT (OUTPATIENT)
Dept: REHABILITATION | Facility: HOSPITAL | Age: 23
End: 2018-02-12
Payer: COMMERCIAL

## 2018-02-12 DIAGNOSIS — M25.541 JOINT PAIN IN FINGERS OF RIGHT HAND: Primary | ICD-10-CM

## 2018-02-12 DIAGNOSIS — M25.441 HAND JOINT EFFUSION, RIGHT: ICD-10-CM

## 2018-02-12 DIAGNOSIS — M25.641 STIFFNESS OF RIGHT HAND JOINT: ICD-10-CM

## 2018-02-12 DIAGNOSIS — R29.898 RIGHT HAND WEAKNESS: ICD-10-CM

## 2018-02-12 PROCEDURE — 97110 THERAPEUTIC EXERCISES: CPT | Mod: PN

## 2018-02-12 NOTE — PROGRESS NOTES
Time in 5  Time out 530      Timed units  2 therex                              Time:5-530      S:no new issues  Pain:continues to decrease in intensity and frequency    O:    Prom tested in splint                           IF                  LF                   RF                     SF  Composite flex                                  0 cm to dpc     0                     0.5                     0.5  Intrinsic stretch                                 0 cm to A1       0                     0                         0  Composite ext (mcp not tested)      Full at pip and dip all 4 digits    Scar massage  Stretches as tolerated-pan free in splint: composite flex rf, sf; intrinsic rf, sf  arom x 15: pip and dip ext with mcps passively held in full flex 2 thru 5  Protected tenodesis x 10  Sampled place hold fist after above stretches since composite flex at rf and sf noted to be full after stretches    S/p 3 w 2 d      A:place hold fist shows full mcp flexion with mod decrease at pip joints and max at dip joints          P:test MARIE vs TPM and likely will need place hold exercises to be started

## 2018-02-15 ENCOUNTER — CLINICAL SUPPORT (OUTPATIENT)
Dept: REHABILITATION | Facility: HOSPITAL | Age: 23
End: 2018-02-15
Attending: ORTHOPAEDIC SURGERY
Payer: COMMERCIAL

## 2018-02-15 DIAGNOSIS — M25.441 HAND JOINT EFFUSION, RIGHT: ICD-10-CM

## 2018-02-15 DIAGNOSIS — R29.898 RIGHT HAND WEAKNESS: ICD-10-CM

## 2018-02-15 DIAGNOSIS — M25.541 JOINT PAIN IN FINGERS OF RIGHT HAND: Primary | ICD-10-CM

## 2018-02-15 DIAGNOSIS — M25.641 STIFFNESS OF RIGHT HAND JOINT: ICD-10-CM

## 2018-02-15 PROCEDURE — 97110 THERAPEUTIC EXERCISES: CPT | Mod: PN

## 2018-02-15 NOTE — PROGRESS NOTES
Time in 7  Time out 737    Timed units  2 therex                              Time:7-737      S:doing HEP  Pain:continues to decrease in intensity and frequency    O:  Greater than 50 degrees noted between MARIE and TPM for rf and sf    Issued protected tenodesis and place hold fist 10 reps 6 x day which were practiced today    Reviewed previously issued exercises and scar massage    Trimmed ulnar portion of dbs to allow for better strap position on sf to allow for better pip ext    S/p 3 w 5 d        A:pretty decent pullthrough noted with place hold fist             P:slowly progress to splint weaning, TGEs, and very light use with ADL

## 2018-02-22 ENCOUNTER — CLINICAL SUPPORT (OUTPATIENT)
Dept: REHABILITATION | Facility: HOSPITAL | Age: 23
End: 2018-02-22
Attending: ORTHOPAEDIC SURGERY
Payer: COMMERCIAL

## 2018-02-22 DIAGNOSIS — M25.641 STIFFNESS OF RIGHT HAND JOINT: ICD-10-CM

## 2018-02-22 DIAGNOSIS — M25.541 JOINT PAIN IN FINGERS OF RIGHT HAND: Primary | ICD-10-CM

## 2018-02-22 DIAGNOSIS — R29.898 RIGHT HAND WEAKNESS: ICD-10-CM

## 2018-02-22 DIAGNOSIS — M25.441 HAND JOINT EFFUSION, RIGHT: ICD-10-CM

## 2018-02-22 PROCEDURE — 97110 THERAPEUTIC EXERCISES: CPT | Mod: PN

## 2018-02-22 NOTE — PROGRESS NOTES
Time in 5  Time out 6    Timed units  4 therex                              Time:5-6      S:understands current HEP  Pain:continues to decrease in intensity and frequency    O:  S/p 4 w 5 d    Prom               If                      Lf                   rf                         Sf  mcp              0/90                  0/90                0/90                   0/90  Pip                0/100               0/100             0/100                  5/100  Dip              0/90                  0/90                0/90                   0/90    arom            If               Lf                          rf                     sf  mcp           0/75            0/90                      0/90               0/85  Pip            0/90            0/90 5/75               10/60  Dip            0/35            0/60                    0/25                  0/45    Issued current HEP: scar massage with scar massager as much as possible, wear splint all the time except for hygiene (sit at table to clean hand) and when at home when awake leave splint off and use for extremely light use only (dressing, eating, hygiene, etc.), issued TGEs fist, hook, and wave plus wrist df/pf and wrist rd/ud    Scar massage  Composite flex stretches if thru sf as tolerated pain free, active ext if thru sf with mcps held passively in flex x 10  Hook, fist, wave, wrist df/pf and rd/ud x 10  A:    Pretty decent pullthrough given nature of repairs and time from surgery, slowly advancing to splint d/c and blocking exercises should promote continued glide            P: consider adding dip and pip blocking next week pending MARIE vs TPM results

## 2018-02-26 ENCOUNTER — OFFICE VISIT (OUTPATIENT)
Dept: ORTHOPEDICS | Facility: CLINIC | Age: 23
End: 2018-02-26
Payer: COMMERCIAL

## 2018-02-26 DIAGNOSIS — S61.401A FLEXOR TENDON LACERATION OF HAND WITH OPEN WOUND, RIGHT, INITIAL ENCOUNTER: Primary | ICD-10-CM

## 2018-02-26 DIAGNOSIS — S66.821A FLEXOR TENDON LACERATION OF HAND WITH OPEN WOUND, RIGHT, INITIAL ENCOUNTER: Primary | ICD-10-CM

## 2018-02-26 PROCEDURE — 99024 POSTOP FOLLOW-UP VISIT: CPT | Mod: S$GLB,,, | Performed by: ORTHOPAEDIC SURGERY

## 2018-02-26 PROCEDURE — 99999 PR PBB SHADOW E&M-EST. PATIENT-LVL II: CPT | Mod: PBBFAC,,, | Performed by: ORTHOPAEDIC SURGERY

## 2018-03-01 ENCOUNTER — TELEPHONE (OUTPATIENT)
Dept: REHABILITATION | Facility: HOSPITAL | Age: 23
End: 2018-03-01

## 2018-03-05 ENCOUNTER — CLINICAL SUPPORT (OUTPATIENT)
Dept: REHABILITATION | Facility: HOSPITAL | Age: 23
End: 2018-03-05
Attending: ORTHOPAEDIC SURGERY
Payer: COMMERCIAL

## 2018-03-05 DIAGNOSIS — M25.441 HAND JOINT EFFUSION, RIGHT: ICD-10-CM

## 2018-03-05 DIAGNOSIS — M25.641 STIFFNESS OF RIGHT HAND JOINT: ICD-10-CM

## 2018-03-05 DIAGNOSIS — M25.541 JOINT PAIN IN FINGERS OF RIGHT HAND: Primary | ICD-10-CM

## 2018-03-05 DIAGNOSIS — R29.898 RIGHT HAND WEAKNESS: ICD-10-CM

## 2018-03-05 PROCEDURE — 97124 MASSAGE THERAPY: CPT | Mod: PN

## 2018-03-05 PROCEDURE — 97022 WHIRLPOOL THERAPY: CPT | Mod: PN

## 2018-03-05 PROCEDURE — 97110 THERAPEUTIC EXERCISES: CPT | Mod: PN

## 2018-03-05 NOTE — PROGRESS NOTES
Time in 5  Time out 6    untimed units    fluido                               Time:5-515    Timed units  1 massage                              Time:515-530  2 therex                              Time:530-6      S:got fired from job  Pain:continues to decrease in intensity and frequency    O:    Prom                 If               Lf                  rf                   Sf  mcp                0/90            0/90              0/90               0/90  Pip                  0/100          0/100           0/100             5/100  Dip                  0/90            0/90             0/90               0/90      arom                  If                  Lf             rf                    sf  mcp                0/85              0/85           0/75               0/55  Pip                  0/75               0/75           0/80              5/75  Dip                  0/40               0/55           0/35              0/45    fluido    Issued current HEP: d/c splint, use hand for very light use only (dressing, hygiene, driving, eating, etc.), scar massage as much as possible; 10 reps, 6 x day of wave, fist, hook, straight, lifts, dip blocking, pip blocking    Scar massage  10 each of above exercises  Composite flex, intrinsic stretches as tolerated-pain free                                  Since therapy started pt. Has no showed 3 times  A:pullthrough noted, correct progression of rehab imperative to properly improve glide while protecting repairs            P:transition to putty scrapes and loops

## 2018-03-08 ENCOUNTER — CLINICAL SUPPORT (OUTPATIENT)
Dept: REHABILITATION | Facility: HOSPITAL | Age: 23
End: 2018-03-08
Attending: ORTHOPAEDIC SURGERY
Payer: COMMERCIAL

## 2018-03-08 DIAGNOSIS — M25.541 JOINT PAIN IN FINGERS OF RIGHT HAND: Primary | ICD-10-CM

## 2018-03-08 DIAGNOSIS — R29.898 RIGHT HAND WEAKNESS: ICD-10-CM

## 2018-03-08 DIAGNOSIS — M25.641 STIFFNESS OF RIGHT HAND JOINT: ICD-10-CM

## 2018-03-08 DIAGNOSIS — M25.441 HAND JOINT EFFUSION, RIGHT: ICD-10-CM

## 2018-03-08 PROCEDURE — 97022 WHIRLPOOL THERAPY: CPT | Mod: PN

## 2018-03-08 PROCEDURE — 97110 THERAPEUTIC EXERCISES: CPT | Mod: PN

## 2018-03-08 NOTE — PROGRESS NOTES
Time in 5  Time out 6    untimed units    fluido                               Time:5-515      Timed units  3 therex                              Time:515-6      S:doing HEP  Pain:continues to decrease in intensity and frequency    O:  fluido  Scar massage  10 each: wave, straight, hook, fist, lifts, dip blocking, pip blocking  Tan putty smears x 30  ube level 1 x 10'          HEP: issued tan putty smears  A: scar pliability, light use improving nicely; slow transition to PRE will be needed to improve mechanics and tendon function            P:test MARIE vs TPM and upgrade tx prn/vary mcp position with blocking exercises to transmit different amounts of force into adhesions

## 2018-03-19 ENCOUNTER — OFFICE VISIT (OUTPATIENT)
Dept: ORTHOPEDICS | Facility: CLINIC | Age: 23
End: 2018-03-19
Payer: COMMERCIAL

## 2018-03-19 ENCOUNTER — TELEPHONE (OUTPATIENT)
Dept: ORTHOPEDICS | Facility: CLINIC | Age: 23
End: 2018-03-19

## 2018-03-19 DIAGNOSIS — S66.821A FLEXOR TENDON LACERATION OF HAND WITH OPEN WOUND, RIGHT, INITIAL ENCOUNTER: Primary | ICD-10-CM

## 2018-03-19 DIAGNOSIS — S61.401A FLEXOR TENDON LACERATION OF HAND WITH OPEN WOUND, RIGHT, INITIAL ENCOUNTER: Primary | ICD-10-CM

## 2018-03-19 PROCEDURE — 99024 POSTOP FOLLOW-UP VISIT: CPT | Mod: S$GLB,,, | Performed by: ORTHOPAEDIC SURGERY

## 2018-03-19 PROCEDURE — 99999 PR PBB SHADOW E&M-EST. PATIENT-LVL II: CPT | Mod: PBBFAC,,, | Performed by: ORTHOPAEDIC SURGERY

## 2018-03-19 NOTE — PROGRESS NOTES
HISTORY OF PRESENT ILLNESS:  Mr. Crawford in followup of flexor tendon repair of   the right ring and small finger.  He is about two months' postop.  He is doing   well, currently in therapy.    PHYSICAL EXAMINATION:  Right hand looks good.  Incisions well healed.  He does   have some thickening volarly.  He has excellent PIP flexion, but still limited   DIP flexion, just trace pull-through on exam today.    PLAN:  I recommended that he continue to work on exercises to work on regaining   DIP flexion.  Continue therapy.  Increase activity.  He can discontinue the   splint.  No heavy lifting.  Follow up in 3-4 weeks.      SUNG  dd: 03/19/2018 10:43:58 (CDT)  td: 03/20/2018 06:43:11 (CDT)  Doc ID   #[id number]  Job ID #[id number]    CC:

## 2018-03-19 NOTE — TELEPHONE ENCOUNTER
LM informing any sponge would work and can be purchased at any store ([employer], [employer], [employer], [employer]).

## 2018-03-19 NOTE — TELEPHONE ENCOUNTER
----- Message from Mikaela Escobar sent at 3/19/2018 11:29 AM CDT -----  Contact: Marty/ Bharti/[phone] or [phone]  He was in this morning and told to get a round sponge to strengthen his hand; she needs to clarify what it is and where she can find it.

## 2018-03-26 ENCOUNTER — CLINICAL SUPPORT (OUTPATIENT)
Dept: REHABILITATION | Facility: HOSPITAL | Age: 23
End: 2018-03-26
Attending: ORTHOPAEDIC SURGERY
Payer: COMMERCIAL

## 2018-03-26 DIAGNOSIS — M25.541 JOINT PAIN IN FINGERS OF RIGHT HAND: Primary | ICD-10-CM

## 2018-03-26 DIAGNOSIS — M25.641 STIFFNESS OF RIGHT HAND JOINT: ICD-10-CM

## 2018-03-26 DIAGNOSIS — M25.441 HAND JOINT EFFUSION, RIGHT: ICD-10-CM

## 2018-03-26 DIAGNOSIS — R29.898 RIGHT HAND WEAKNESS: ICD-10-CM

## 2018-03-26 PROCEDURE — 97110 THERAPEUTIC EXERCISES: CPT | Mod: PN

## 2018-03-26 PROCEDURE — 97022 WHIRLPOOL THERAPY: CPT | Mod: PN

## 2018-03-26 NOTE — PROGRESS NOTES
Time in 115  Time out 2    untimed units    fluido                               Time:115-130      Timed units  2 therex                              Time:130-2      S:understands HEP upgrade and purpose  Pain:mild, intermittent, diffuse ache rf, sf    O:  Greater than 50 degree difference between MARIE and TPM    fluido  Scar massage  Dip blocking as below          Issued current HEP:    current routine 3-26-18  -light hand use only  -gently massage scars as much as possible  -do below exercises 10 times, 6 x day    -do below exercises 10 times, 6 x day      dip blocking to be varied with mcp at 0, 45, 90 between 1st 2 sessions, 2nd 2 session, and 3rd 2 sessions    TPM                        If                    Lf                      rf                  sf  mcp            0/90                0/90                  0/90              0/90  Pip             0/100             0/100                 0/100             5/100  Dip             0/90              0/90                    0/90             0/90    MARIE                    If                       Lf                    rf                    sf  mcp         0/85                  0/90                 0/95               0/90  Pip          0/80                  0/105               0/90              15/75  Dip         0/60                  0/50                  0/15                5/20    A: transition to putty loops and scrapes as well as ergogripper should be done soon to maximize fdp pullthrough, differential tendon glide, adhesion management, etc.            P:scar massage, PRE, blocking

## 2018-03-29 ENCOUNTER — CLINICAL SUPPORT (OUTPATIENT)
Dept: REHABILITATION | Facility: HOSPITAL | Age: 23
End: 2018-03-29
Attending: ORTHOPAEDIC SURGERY
Payer: COMMERCIAL

## 2018-03-29 DIAGNOSIS — M25.641 STIFFNESS OF RIGHT HAND JOINT: Primary | ICD-10-CM

## 2018-03-29 DIAGNOSIS — R29.898 RIGHT HAND WEAKNESS: ICD-10-CM

## 2018-03-29 DIAGNOSIS — M25.541 JOINT PAIN IN FINGERS OF RIGHT HAND: ICD-10-CM

## 2018-03-29 DIAGNOSIS — M25.441 HAND JOINT EFFUSION, RIGHT: ICD-10-CM

## 2018-03-29 PROCEDURE — 97110 THERAPEUTIC EXERCISES: CPT | Mod: PN

## 2018-03-29 PROCEDURE — 97022 WHIRLPOOL THERAPY: CPT | Mod: PN

## 2018-03-29 NOTE — PROGRESS NOTES
Time in 4  Time out 5    untimed units    fluido                               Time:4-415    Timed units  3 therex                              Time:415-5      S:doing HEP, understands rationale of rehab and likely tx progression, understands to avoid vasoconstrictors as much as possible  Pain:mild, intermittent, diffuse ache rf and sf    O:  fluido  Scar massage  Dip blocking rf, sf 10 each at mcp 0, 45, 90  Tan putty loops rf, sf x 25; tan putty scrapes x 25        Issued tan putty exercises as above to do daily  A: overall joint flexibility and scar mobility continue to improve, correct progression of strengthening imperative to facilitate fdp pullthrough and power grasp long term            P: transition to home gripper

## 2018-04-02 ENCOUNTER — CLINICAL SUPPORT (OUTPATIENT)
Dept: REHABILITATION | Facility: HOSPITAL | Age: 23
End: 2018-04-02
Attending: ORTHOPAEDIC SURGERY
Payer: COMMERCIAL

## 2018-04-02 DIAGNOSIS — R29.898 RIGHT HAND WEAKNESS: ICD-10-CM

## 2018-04-02 DIAGNOSIS — M25.541 JOINT PAIN IN FINGERS OF RIGHT HAND: ICD-10-CM

## 2018-04-02 DIAGNOSIS — M25.441 HAND JOINT EFFUSION, RIGHT: ICD-10-CM

## 2018-04-02 DIAGNOSIS — M25.641 STIFFNESS OF RIGHT HAND JOINT: Primary | ICD-10-CM

## 2018-04-02 PROCEDURE — 97022 WHIRLPOOL THERAPY: CPT | Mod: PN

## 2018-04-02 PROCEDURE — 97110 THERAPEUTIC EXERCISES: CPT | Mod: PN

## 2018-04-02 NOTE — PROGRESS NOTES
Time in 1  Time out 2    untimed units    fluido                               Time:1-115    Timed units  3 therex                              Time:115-2      S:no new issues, did not do putty yet today  Pain:mild, intermittent rf and sf ache    O:    Prom                 If               Lf                  rf                   Sf  mcp                0/90            0/90              0/90               0/90  Pip                  0/100          0/100           0/100             5/100  Dip                  0/90            0/90             0/90               0/90      arom                  If                  Lf             rf                    sf  mcp                0/90              0/90          0/75               0/55  Pip                  0/100              0/100      0/85              5/90  Dip                  0/90             0/90           0/35              0/40    fluido  scar massage  dip, pip blocking rf, sf with mcp at 0, 45, and 90 10 each  Tan putty loops, scrapes 25 each; tan putty large splint stick pull/push 20 pancakes  Light grippers x 10 each  ube 10' level 1      10 wks 2 days s/p  A:proper progression of PRE imperative to facilitate improved flexor tendon glide while simultaneously protecting repair, excellent pullthrough noted with all blocking positions: as fds and fdp strengthen, improved pullthrough at fist end range should be practical            P:increase putty density for HEP; PRE

## 2018-04-05 ENCOUNTER — CLINICAL SUPPORT (OUTPATIENT)
Dept: REHABILITATION | Facility: HOSPITAL | Age: 23
End: 2018-04-05
Attending: ORTHOPAEDIC SURGERY
Payer: COMMERCIAL

## 2018-04-05 DIAGNOSIS — M25.541 JOINT PAIN IN FINGERS OF RIGHT HAND: ICD-10-CM

## 2018-04-05 DIAGNOSIS — M25.441 HAND JOINT EFFUSION, RIGHT: ICD-10-CM

## 2018-04-05 DIAGNOSIS — M25.641 STIFFNESS OF RIGHT HAND JOINT: Primary | ICD-10-CM

## 2018-04-05 DIAGNOSIS — R29.898 RIGHT HAND WEAKNESS: ICD-10-CM

## 2018-04-05 PROCEDURE — 97110 THERAPEUTIC EXERCISES: CPT | Mod: PN

## 2018-04-05 PROCEDURE — 97022 WHIRLPOOL THERAPY: CPT | Mod: PN

## 2018-04-05 NOTE — PROGRESS NOTES
Time in 115  Time out 2    untimed units    fluido                               Time:115-130    Timed units  2 therex                           Time:130-2      S:doing HEP  Pain:0/10 at rest, with sleep, with light use    O:  fluido  Yellow/tan mix putty loops rf, sf 20 each  Yellow/tan mix putty scrapes x 25  Splint stick pull/push yellow/tan putty mix 20 pancakes  Light gripper 2 x 10  ube level 1 x 10'      issued small piece of yellow putty to be added to home tan putty    s/p 10 w 5 d    A: light functional use and tendon pullthrough continues to improve            P:issue home ergogripper

## 2018-04-10 ENCOUNTER — OFFICE VISIT (OUTPATIENT)
Dept: ORTHOPEDICS | Facility: CLINIC | Age: 23
End: 2018-04-10
Payer: COMMERCIAL

## 2018-04-10 DIAGNOSIS — S66.821A FLEXOR TENDON LACERATION OF HAND WITH OPEN WOUND, RIGHT, INITIAL ENCOUNTER: Primary | ICD-10-CM

## 2018-04-10 DIAGNOSIS — S61.401A FLEXOR TENDON LACERATION OF HAND WITH OPEN WOUND, RIGHT, INITIAL ENCOUNTER: Primary | ICD-10-CM

## 2018-04-10 PROCEDURE — 99999 PR PBB SHADOW E&M-EST. PATIENT-LVL II: CPT | Mod: PBBFAC,,, | Performed by: ORTHOPAEDIC SURGERY

## 2018-04-10 PROCEDURE — 99024 POSTOP FOLLOW-UP VISIT: CPT | Mod: S$GLB,,, | Performed by: ORTHOPAEDIC SURGERY

## 2018-04-10 NOTE — PROGRESS NOTES
HISTORY OF PRESENT ILLNESS:  Mr. Crawford is about three months out from repair   of flexion tendons, right ring and small finger.  He is currently in therapy.    He is making some progress, but still has limited flexion at the DIP level.    PHYSICAL EXAMINATION:  RIGHT HAND:  The incisions are all well healed.  He does have some thickening of   the scar volarly especially on the ring finger.  He has limited pull-through of   the DIP, but he does have trace flexion of the DIP.    PIP has excellent motion.    PLAN:  We will try him on some Pennsaid topical anti-inflammatory cream for use   on the fingers t.i.d.  I would also like him to continue therapy, increase   activities as tolerated and follow up in one month.      SUNG  dd: 04/10/2018 09:56:29 (CDT)  td: 04/11/2018 06:57:46 (CDT)  Doc ID   #[id number]  Job ID #[id number]    CC:

## 2018-04-12 ENCOUNTER — CLINICAL SUPPORT (OUTPATIENT)
Dept: REHABILITATION | Facility: HOSPITAL | Age: 23
End: 2018-04-12
Attending: ORTHOPAEDIC SURGERY
Payer: COMMERCIAL

## 2018-04-12 DIAGNOSIS — M25.541 JOINT PAIN IN FINGERS OF RIGHT HAND: ICD-10-CM

## 2018-04-12 DIAGNOSIS — M25.641 STIFFNESS OF RIGHT HAND JOINT: Primary | ICD-10-CM

## 2018-04-12 DIAGNOSIS — R29.898 RIGHT HAND WEAKNESS: ICD-10-CM

## 2018-04-12 DIAGNOSIS — M25.441 HAND JOINT EFFUSION, RIGHT: ICD-10-CM

## 2018-04-12 PROCEDURE — 97022 WHIRLPOOL THERAPY: CPT | Mod: PN

## 2018-04-12 PROCEDURE — 97110 THERAPEUTIC EXERCISES: CPT | Mod: PN

## 2018-04-12 NOTE — PROGRESS NOTES
Time in 5  Time out 6    untimed units    fluido                               Time:5-515    Timed units  3 therex                              Time:515-6        S:not doing putty or scar massage as frequently as advised  Pain:mild, intermittent, diffuse ache rf, sf    O:  Fluido: 15'    Scar massage: reviewed    Manual tx: n/a    Arom: dip blocking mcp 0, 45, 90 10 each    Stretches as tolerated-pain free: n/a    HEP: enthusiastically highlighted to patient to do HEP as advised to maximize functional recovery of hand and fdp pullthrough; issued 15# ergogripper to be done 25 reps daily    Education: see above    PRE: yellow/tan mix loops 25 each rf, sf plus scrapes x 25; 15# ergogripper x 15    ube: level 1 x 10'        A: limited active dip flexion at end range fist most likely a mixture of scar adherence on tendon and pure  weakness            P: PRE

## 2018-04-16 ENCOUNTER — CLINICAL SUPPORT (OUTPATIENT)
Dept: REHABILITATION | Facility: HOSPITAL | Age: 23
End: 2018-04-16
Attending: ORTHOPAEDIC SURGERY
Payer: COMMERCIAL

## 2018-04-16 DIAGNOSIS — R29.898 RIGHT HAND WEAKNESS: ICD-10-CM

## 2018-04-16 DIAGNOSIS — M25.641 STIFFNESS OF RIGHT HAND JOINT: Primary | ICD-10-CM

## 2018-04-16 DIAGNOSIS — M25.541 JOINT PAIN IN FINGERS OF RIGHT HAND: ICD-10-CM

## 2018-04-16 DIAGNOSIS — M25.441 HAND JOINT EFFUSION, RIGHT: ICD-10-CM

## 2018-04-16 PROCEDURE — 97110 THERAPEUTIC EXERCISES: CPT | Mod: PN

## 2018-04-16 NOTE — PROGRESS NOTES
Time in 515  Time out 6      Timed units  3 therex                              Time:515-6      S: understands tx rationale and likely rehab progression  Pain:continues to decrease in intensity and frequency    O:  Fluido: n/a    Scar massage: n/a    Manual tx: n/a    Arom: dip blocking all 3 mcp positions rf, sf 10 each    Stretches as tolerated-pain free: n/a    HEP: reviewed    Education: purpose of scar massage and PRE reviewed    PRE: yellow putty loops 25 each rf, sf; yellow putty scrapes x 25; 20# gripper x 25; light grippers 2 x 10; splint stick pull/push yellow putty 25 pancakes      s/p 12 w 2 d      A: functional use continues to improve, good pullthrough at fds and fdp noted with blocking exercises at all 3 mcp positions, progressive increase in resistance with PRE should promote improved fdp glide            P:PRE, test  force

## 2018-04-19 ENCOUNTER — CLINICAL SUPPORT (OUTPATIENT)
Dept: REHABILITATION | Facility: HOSPITAL | Age: 23
End: 2018-04-19
Attending: ORTHOPAEDIC SURGERY
Payer: COMMERCIAL

## 2018-04-19 DIAGNOSIS — M25.441 HAND JOINT EFFUSION, RIGHT: ICD-10-CM

## 2018-04-19 DIAGNOSIS — M25.541 JOINT PAIN IN FINGERS OF RIGHT HAND: ICD-10-CM

## 2018-04-19 DIAGNOSIS — R29.898 RIGHT HAND WEAKNESS: ICD-10-CM

## 2018-04-19 DIAGNOSIS — M25.641 STIFFNESS OF RIGHT HAND JOINT: Primary | ICD-10-CM

## 2018-04-19 PROCEDURE — 97110 THERAPEUTIC EXERCISES: CPT | Mod: PN

## 2018-04-19 NOTE — PROGRESS NOTES
Time in 5  Time out 6      Timed units  4 therex                              Time:5-6      S:no new issues  Pain:continues to decrease in intensity and frequency    O:             II               III  r        50#           50   l        85             70      Fluido: n/a    Scar massage: reviewed    Manual tx: n/a    Arom: 10 each rf, sf dip blocking at mcp 0, 45, 90    Stretches as tolerated-pain free: n/a    HEP: issued yellow putty which is to be used at this point, increased home gripper to 20#    Education: rationale behind increasing resistance on home exercises    PRE: yellow putty loops rf, sf 25 each; yellow putty scrapes x 25; splint stick pull/push yellow putty x 25; light grippers 2 x 10    ube: level 1 x 15'            A: overall use of hand continues to improve, clinical focus should be on facilitating improved active dip flexion at fist end range            P: increase home PRE resistance prn

## 2018-05-10 ENCOUNTER — TELEPHONE (OUTPATIENT)
Dept: ORTHOPEDICS | Facility: CLINIC | Age: 23
End: 2018-05-10

## 2018-05-10 ENCOUNTER — OFFICE VISIT (OUTPATIENT)
Dept: ORTHOPEDICS | Facility: CLINIC | Age: 23
End: 2018-05-10
Payer: COMMERCIAL

## 2018-05-10 VITALS — BODY MASS INDEX: 25.92 KG/M2 | WEIGHT: 175 LBS | HEIGHT: 69 IN

## 2018-05-10 DIAGNOSIS — S66.821A FLEXOR TENDON LACERATION OF HAND WITH OPEN WOUND, RIGHT, INITIAL ENCOUNTER: Primary | ICD-10-CM

## 2018-05-10 DIAGNOSIS — S61.401A FLEXOR TENDON LACERATION OF HAND WITH OPEN WOUND, RIGHT, INITIAL ENCOUNTER: Primary | ICD-10-CM

## 2018-05-10 PROCEDURE — 3008F BODY MASS INDEX DOCD: CPT | Mod: CPTII,S$GLB,, | Performed by: ORTHOPAEDIC SURGERY

## 2018-05-10 PROCEDURE — 99213 OFFICE O/P EST LOW 20 MIN: CPT | Mod: S$GLB,,, | Performed by: ORTHOPAEDIC SURGERY

## 2018-05-10 PROCEDURE — 99999 PR PBB SHADOW E&M-EST. PATIENT-LVL II: CPT | Mod: PBBFAC,,, | Performed by: ORTHOPAEDIC SURGERY

## 2018-05-10 NOTE — TELEPHONE ENCOUNTER
I spoke with the patient and informed him he could come in but may have to wait until the other patients are seen.

## 2018-05-10 NOTE — PROGRESS NOTES
HISTORY OF PRESENT ILLNESS:  Mr. Crawford about four months out from flexor   tendon repair of the right hand.  He has been in therapy, but a little bit   sporadic with his attendance.  He is about four months' postop.    PHYSICAL EXAMINATION:  RIGHT HAND:  Both incisions well healed.  Swelling is minimal.  He does have   better flexion now, especially of the small finger he has excellent flexion.    The ring finger still lacks a little bit of flexion of the DIP joint, but he is   definitely improved compared to previous exams.    PLAN:  I think he can continue with a home exercise program.  I do not think   necessarily going to therapy is crucial at this point.  I am more interested in   him doing things at home.  Increase activities as tolerated.  Follow up in two   months for recheck.      SUNG  dd: 05/10/2018 10:51:25 (CDT)  td: 05/11/2018 07:00:30 (CDT)  Doc ID   #[id number]  Job ID #[id number]    CC:

## 2018-05-10 NOTE — TELEPHONE ENCOUNTER
----- Message from Lakshmi Miller sent at 5/10/2018  9:03 AM CDT -----  Contact: [phone]/self  Patient is running late for their appointment and would like to know if they can still be seen. Please advise.

## 2018-05-15 ENCOUNTER — CLINICAL SUPPORT (OUTPATIENT)
Dept: REHABILITATION | Facility: HOSPITAL | Age: 23
End: 2018-05-15
Attending: ORTHOPAEDIC SURGERY
Payer: COMMERCIAL

## 2018-05-15 DIAGNOSIS — R29.898 RIGHT HAND WEAKNESS: ICD-10-CM

## 2018-05-15 DIAGNOSIS — M25.641 STIFFNESS OF RIGHT HAND JOINT: Primary | ICD-10-CM

## 2018-05-15 DIAGNOSIS — M25.441 HAND JOINT EFFUSION, RIGHT: ICD-10-CM

## 2018-05-15 DIAGNOSIS — M25.541 JOINT PAIN IN FINGERS OF RIGHT HAND: ICD-10-CM

## 2018-05-15 PROCEDURE — 97110 THERAPEUTIC EXERCISES: CPT | Mod: PN

## 2018-05-15 NOTE — PROGRESS NOTES
Time in 1  Time out 2      Timed units  4 therex                              Time:1-2      S:saw ortho who released to full duty  Pain:continues to decrease in intensity and frequency    O:  Fluido: n/a    Scar massage: reviewed    Manual tx: n/a    Arom: reviewed blocking routine    Stretches as tolerated-pain free: n/a    HEP:    Issued small portion of green putty to add to home yellow putty, issued 10# band to increase home gripper to 30#    Issued below routine    current routine 5-15-18  -do blocking exercises for about another month  -do putty and gripper for about another 3 months; do 2-3 x wk  -do scar massage until end of year  -slowly advance use as tolerated-pain free based on Samis advice        Education: see above    PRE:     Green/yellow  putty loops, scrapes, splint stick pull/push, light grippers as before    arom              rf              Sf    mcp           0/90            0/90  Pip             0/90            5/90         Dip             0/50            0/65            II            III  r    75#          70  l    95            80    A:all goals met            P:d/c

## 2018-07-10 ENCOUNTER — OFFICE VISIT (OUTPATIENT)
Dept: ORTHOPEDICS | Facility: CLINIC | Age: 23
End: 2018-07-10
Payer: COMMERCIAL

## 2018-07-10 VITALS — BODY MASS INDEX: 25.92 KG/M2 | HEIGHT: 69 IN | WEIGHT: 175 LBS

## 2018-07-10 DIAGNOSIS — S61.401A FLEXOR TENDON LACERATION OF HAND WITH OPEN WOUND, RIGHT, INITIAL ENCOUNTER: Primary | ICD-10-CM

## 2018-07-10 DIAGNOSIS — S66.821A FLEXOR TENDON LACERATION OF HAND WITH OPEN WOUND, RIGHT, INITIAL ENCOUNTER: Primary | ICD-10-CM

## 2018-07-10 PROCEDURE — 99999 PR PBB SHADOW E&M-EST. PATIENT-LVL II: CPT | Mod: PBBFAC,,, | Performed by: ORTHOPAEDIC SURGERY

## 2018-07-10 PROCEDURE — 99499 UNLISTED E&M SERVICE: CPT | Mod: S$GLB,,, | Performed by: ORTHOPAEDIC SURGERY

## 2018-07-10 NOTE — PROGRESS NOTES
HISTORY OF PRESENT ILLNESS:  Mr. Crawford is about six months out from flexor   tendon and nerve repair of the right ring and small finger.  He is doing   excellent.  No problems reported.    PHYSICAL EXAMINATION:  RIGHT HAND:  The incisions well healed.  No swelling, no tenderness.  Range of   motion is basically full, has good pull-through of FDP and FDS tendons.    Strength is improved.    PLAN:  Return to full activities.  Follow up as needed.      SUNG  dd: 07/10/2018 11:06:01 (SALLYT)  td: 07/11/2018 08:24:02 (BRIAN)  Doc ID   #[id number]  Job ID #[id number]    CC:

## 2019-01-14 ENCOUNTER — TELEPHONE (OUTPATIENT)
Dept: ORTHOPEDICS | Facility: CLINIC | Age: 24
End: 2019-01-14

## 2019-01-14 NOTE — TELEPHONE ENCOUNTER
----- Message from Linda Becerra sent at 1/14/2019 10:32 AM CST -----  Contact: Self [phone]  Patient would like to speak with you about getting scar tissue removed from his hand. Please advise

## 2019-01-14 NOTE — TELEPHONE ENCOUNTER
I spoke with the patient and advised him to come in for an appointment to come in and discuss his concerns with dr. Fried. An appointment was made and he was made aware of date, time and location.

## 2019-01-22 ENCOUNTER — OFFICE VISIT (OUTPATIENT)
Dept: ORTHOPEDICS | Facility: CLINIC | Age: 24
End: 2019-01-22
Payer: COMMERCIAL

## 2019-01-22 VITALS — BODY MASS INDEX: 25.92 KG/M2 | WEIGHT: 175 LBS | HEIGHT: 69 IN

## 2019-01-22 DIAGNOSIS — S66.821A FLEXOR TENDON LACERATION OF HAND WITH OPEN WOUND, RIGHT, INITIAL ENCOUNTER: Primary | ICD-10-CM

## 2019-01-22 DIAGNOSIS — S61.401A FLEXOR TENDON LACERATION OF HAND WITH OPEN WOUND, RIGHT, INITIAL ENCOUNTER: Primary | ICD-10-CM

## 2019-01-22 PROCEDURE — 3008F BODY MASS INDEX DOCD: CPT | Mod: CPTII,S$GLB,, | Performed by: ORTHOPAEDIC SURGERY

## 2019-01-22 PROCEDURE — 99999 PR PBB SHADOW E&M-EST. PATIENT-LVL II: ICD-10-PCS | Mod: PBBFAC,,, | Performed by: ORTHOPAEDIC SURGERY

## 2019-01-22 PROCEDURE — 99213 OFFICE O/P EST LOW 20 MIN: CPT | Mod: S$GLB,,, | Performed by: ORTHOPAEDIC SURGERY

## 2019-01-22 PROCEDURE — 99213 PR OFFICE/OUTPT VISIT, EST, LEVL III, 20-29 MIN: ICD-10-PCS | Mod: S$GLB,,, | Performed by: ORTHOPAEDIC SURGERY

## 2019-01-22 PROCEDURE — 3008F PR BODY MASS INDEX (BMI) DOCUMENTED: ICD-10-PCS | Mod: CPTII,S$GLB,, | Performed by: ORTHOPAEDIC SURGERY

## 2019-01-22 PROCEDURE — 99999 PR PBB SHADOW E&M-EST. PATIENT-LVL II: CPT | Mod: PBBFAC,,, | Performed by: ORTHOPAEDIC SURGERY

## 2019-01-22 NOTE — PROGRESS NOTES
HISTORY OF PRESENT ILLNESS:  Mr. Crawford is about a year out from flexor tendon   and nerve repair, right ring and small finger.  He has done well.  He is back to   full activities, but he is recently thinking about going in the  and he   was concerned about whether his hand might cause a problem with his    or possibly if there is any surgery to improve things a bit.    PHYSICAL EXAMINATION:  RIGHT HAND:  The incisions are well healed.  Scarring is minimal.  Range of   motion is basically full.  He has good  strength and good strength.  The   only deficit he has is just a slight loss of flexion at the PIP joint of the   ring and small finger, but this is minimal, only about maybe 10 or 15 degrees   and I do not think it will be a problem.    PLAN:  I think he is cleared for full  activities including pull-ups,   etc.  I am happy to write a note if he needs this.  Follow up as needed.      SUNG  dd: 01/22/2019 11:19:33 (CST)  td: 01/23/2019 07:20:48 (CST)  Doc ID   #[id number]  Job ID #[id number]    CC:

## 2019-02-05 ENCOUNTER — OFFICE VISIT (OUTPATIENT)
Dept: DERMATOLOGY | Facility: CLINIC | Age: 24
End: 2019-02-05
Payer: COMMERCIAL

## 2019-02-05 DIAGNOSIS — L21.9 SEBORRHEIC DERMATITIS OF SCALP: Primary | ICD-10-CM

## 2019-02-05 DIAGNOSIS — L91.0 HYPERTROPHIC SCAR: ICD-10-CM

## 2019-02-05 PROCEDURE — 11900 PR INJECTION INTO SKIN LESIONS, UP TO 7: ICD-10-PCS | Mod: S$GLB,,, | Performed by: DERMATOLOGY

## 2019-02-05 PROCEDURE — 11900 INJECT SKIN LESIONS </W 7: CPT | Mod: S$GLB,,, | Performed by: DERMATOLOGY

## 2019-02-05 PROCEDURE — 99999 PR PBB SHADOW E&M-EST. PATIENT-LVL II: CPT | Mod: PBBFAC,,, | Performed by: DERMATOLOGY

## 2019-02-05 PROCEDURE — 99201 PR OFFICE/OUTPT VISIT,NEW,LEVL I: CPT | Mod: 25,S$GLB,, | Performed by: DERMATOLOGY

## 2019-02-05 PROCEDURE — 99201 PR OFFICE/OUTPT VISIT,NEW,LEVL I: ICD-10-PCS | Mod: 25,S$GLB,, | Performed by: DERMATOLOGY

## 2019-02-05 PROCEDURE — 99999 PR PBB SHADOW E&M-EST. PATIENT-LVL II: ICD-10-PCS | Mod: PBBFAC,,, | Performed by: DERMATOLOGY

## 2019-02-05 RX ORDER — TRIAMCINOLONE ACETONIDE 40 MG/ML
40 INJECTION, SUSPENSION INTRA-ARTICULAR; INTRAMUSCULAR
Status: DISCONTINUED | OUTPATIENT
Start: 2019-02-05 | End: 2019-04-03

## 2019-02-05 RX ORDER — KETOCONAZOLE 20 MG/ML
SHAMPOO, SUSPENSION TOPICAL
Qty: 120 ML | Refills: 5 | Status: SHIPPED | OUTPATIENT
Start: 2019-02-05 | End: 2023-11-30

## 2019-02-05 NOTE — PROGRESS NOTES
Subjective:       Patient ID:  Yunior Crawford is a 23 y.o. male who presents for   Chief Complaint   Patient presents with    Scar     R hand    Dry Skin     scalp     Initial visit    H/o tendon transection with a knife, s/p repair with good ortho results  Bothered by thick scars  Worried it may disqualified  application because of these      Scar  - Initial  Affected locations: right hand  Duration: months.  Signs and Symptoms: bumpy.  Severity: mild  Timing: constant  Aggravated by: nothing  Treatments tried: OTC creams for scarring.    Dry Skin  - Initial  Affected locations: scalp  Duration: years.  Signs / symptoms: dryness (flaky)  Severity: mild  Timing: constant  Aggravated by: nothing  Treatments tried: Neutrogena tea gel and keto shampoo.  Improvement on treatment: no relief        Review of Systems   Constitutional: Negative for fever, chills and fatigue.   Skin: Positive for dry skin. Negative for daily sunscreen use, activity-related sunscreen use and wears hat.   Hematologic/Lymphatic: Does not bruise/bleed easily.        Objective:    Physical Exam   Skin:   Areas Examined (abnormalities noted in diagram):   Scalp / Hair Palpated and Inspected  Head / Face Inspection Performed  Back Inspection Performed                       Diagram Legend     Erythematous scaling macule/papule c/w actinic keratosis       Vascular papule c/w angioma      Pigmented verrucoid papule/plaque c/w seborrheic keratosis      Yellow umbilicated papule c/w sebaceous hyperplasia      Irregularly shaped tan macule c/w lentigo     1-2 mm smooth white papules consistent with Milia      Movable subcutaneous cyst with punctum c/w epidermal inclusion cyst      Subcutaneous movable cyst c/w pilar cyst      Firm pink to brown papule c/w dermatofibroma      Pedunculated fleshy papule(s) c/w skin tag(s)      Evenly pigmented macule c/w junctional nevus     Mildly variegated pigmented, slightly irregular-bordered macule  c/w mildly atypical nevus      Flesh colored to evenly pigmented papule c/w intradermal nevus       Pink pearly papule/plaque c/w basal cell carcinoma      Erythematous hyperkeratotic cursted plaque c/w SCC      Surgical scar with no sign of skin cancer recurrence      Open and closed comedones      Inflammatory papules and pustules      Verrucoid papule consistent consistent with wart     Erythematous eczematous patches and plaques     Dystrophic onycholytic nail with subungual debris c/w onychomycosis     Umbilicated papule    Erythematous-base heme-crusted tan verrucoid plaque consistent with inflamed seborrheic keratosis     Erythematous Silvery Scaling Plaque c/w Psoriasis     See annotation      Assessment / Plan:        Seborrheic dermatitis of scalp, mild, resume keto shampoo, alternate with tgel or zinc OTC shampoo  -     ketoconazole (NIZORAL) 2 % shampoo; Wash hair with medicated shampoo at least 2x/week - let sit on scalp at least 5 minutes prior to rinsing  Dispense: 120 mL; Refill: 5    Hypertrophic scar, R hand, palmar aspect  One area of hypertrophy, ILK 20 today  Intralesional Kenalog 20mg/cc (0.2 cc total) injected into 1 lesions on the R 5th digit palmar aspect today after obtaining verbal consent including risk of surrounding hypopigmentation. Patient tolerated procedure well.    Units:1  NDC for Kenalog 40mg/cc:  0755-1132-44    -     triamcinolone acetonide injection 40 mg             No Follow-up on file.

## 2019-04-03 ENCOUNTER — CLINICAL SUPPORT (OUTPATIENT)
Dept: URGENT CARE | Facility: CLINIC | Age: 24
End: 2019-04-03
Payer: COMMERCIAL

## 2019-04-03 VITALS
HEIGHT: 69 IN | WEIGHT: 180 LBS | BODY MASS INDEX: 26.66 KG/M2 | SYSTOLIC BLOOD PRESSURE: 110 MMHG | TEMPERATURE: 99 F | OXYGEN SATURATION: 97 % | RESPIRATION RATE: 16 BRPM | DIASTOLIC BLOOD PRESSURE: 58 MMHG | HEART RATE: 68 BPM

## 2019-04-03 DIAGNOSIS — J01.90 ACUTE NON-RECURRENT SINUSITIS, UNSPECIFIED LOCATION: Primary | ICD-10-CM

## 2019-04-03 PROCEDURE — 96372 PR INJECTION,THERAP/PROPH/DIAG2ST, IM OR SUBCUT: ICD-10-PCS | Mod: S$GLB,,, | Performed by: NURSE PRACTITIONER

## 2019-04-03 PROCEDURE — 99214 OFFICE O/P EST MOD 30 MIN: CPT | Mod: 25,S$GLB,, | Performed by: NURSE PRACTITIONER

## 2019-04-03 PROCEDURE — 96372 THER/PROPH/DIAG INJ SC/IM: CPT | Mod: S$GLB,,, | Performed by: NURSE PRACTITIONER

## 2019-04-03 PROCEDURE — 99214 PR OFFICE/OUTPT VISIT, EST, LEVL IV, 30-39 MIN: ICD-10-PCS | Mod: 25,S$GLB,, | Performed by: NURSE PRACTITIONER

## 2019-04-03 RX ORDER — DEXAMETHASONE SODIUM PHOSPHATE 4 MG/ML
8 INJECTION, SOLUTION INTRA-ARTICULAR; INTRALESIONAL; INTRAMUSCULAR; INTRAVENOUS; SOFT TISSUE
Status: COMPLETED | OUTPATIENT
Start: 2019-04-03 | End: 2019-04-03

## 2019-04-03 RX ORDER — AMOXICILLIN AND CLAVULANATE POTASSIUM 875; 125 MG/1; MG/1
1 TABLET, FILM COATED ORAL EVERY 12 HOURS
Qty: 20 TABLET | Refills: 0 | Status: SHIPPED | OUTPATIENT
Start: 2019-04-03 | End: 2019-04-13

## 2019-04-03 RX ADMIN — DEXAMETHASONE SODIUM PHOSPHATE 8 MG: 4 INJECTION, SOLUTION INTRA-ARTICULAR; INTRALESIONAL; INTRAMUSCULAR; INTRAVENOUS; SOFT TISSUE at 01:04

## 2019-04-03 NOTE — PROGRESS NOTES
Subjective:       Patient ID: Yunior Crawford is a 23 y.o. male.    Vitals:  vitals were not taken for this visit.     Chief Complaint: Cough and Nasal Congestion    Mr. Crawford presents today with complaints of sinus congestion for 4 days. It is associated with post nasal drip and sore throat. He denies fever, chills, or cough.    Cough   This is a new problem. The current episode started in the past 7 days. The problem has been gradually worsening. The problem occurs constantly. The cough is productive of sputum. Associated symptoms include nasal congestion and postnasal drip. Pertinent negatives include no chills, ear pain, eye redness, fever, hemoptysis, myalgias, rash, sore throat, shortness of breath or wheezing. Nothing aggravates the symptoms. He has tried nothing for the symptoms. The treatment provided no relief.       Constitution: Negative for chills, sweating, fatigue and fever.   HENT: Positive for postnasal drip and sinus pressure. Negative for ear pain, congestion, sinus pain, sore throat and voice change.         Throat pain   Neck: Negative for painful lymph nodes.   Eyes: Negative for eye redness.   Respiratory: Negative for chest tightness, cough, sputum production, bloody sputum, COPD, shortness of breath, stridor, wheezing and asthma.    Gastrointestinal: Negative for nausea and vomiting.   Musculoskeletal: Negative for muscle ache.   Skin: Negative for rash.   Allergic/Immunologic: Negative for seasonal allergies and asthma.   Hematologic/Lymphatic: Negative for swollen lymph nodes.       Objective:      Physical Exam   Constitutional: He is oriented to person, place, and time. He appears well-developed and well-nourished. He is cooperative.  Non-toxic appearance. He does not appear ill. No distress.   HENT:   Head: Normocephalic and atraumatic.   Right Ear: Hearing, tympanic membrane, external ear and ear canal normal.   Left Ear: Hearing, tympanic membrane, external ear and ear canal  normal.   Nose: Nose normal. No mucosal edema, rhinorrhea or nasal deformity. No epistaxis. Right sinus exhibits no maxillary sinus tenderness and no frontal sinus tenderness. Left sinus exhibits no maxillary sinus tenderness and no frontal sinus tenderness.   Mouth/Throat: Uvula is midline, oropharynx is clear and moist and mucous membranes are normal. No trismus in the jaw. Normal dentition. No uvula swelling. No posterior oropharyngeal erythema.   Eyes: Conjunctivae and lids are normal. No scleral icterus.   Sclera clear bilat   Neck: Trachea normal, full passive range of motion without pain and phonation normal. Neck supple.   Cardiovascular: Normal rate, regular rhythm, normal heart sounds, intact distal pulses and normal pulses.   Pulmonary/Chest: Effort normal and breath sounds normal. No respiratory distress.   Abdominal: Soft. Normal appearance and bowel sounds are normal. He exhibits no distension. There is no tenderness.   Musculoskeletal: Normal range of motion. He exhibits no edema or deformity.   Neurological: He is alert and oriented to person, place, and time. He exhibits normal muscle tone. Coordination normal.   Skin: Skin is warm, dry and intact. He is not diaphoretic. No pallor.   Psychiatric: He has a normal mood and affect. His speech is normal and behavior is normal. Judgment and thought content normal. Cognition and memory are normal.   Nursing note and vitals reviewed.      Assessment:       No diagnosis found.    Plan:         There are no diagnoses linked to this encounter.

## 2019-04-03 NOTE — PATIENT INSTRUCTIONS
Sinusitis (No Antibiotics)    The sinuses are air-filled spaces within the bones of the face. They connect to the inside of the nose. Sinusitis is an inflammation of the tissue lining the sinus cavity. Sinus inflammation can occur during a cold. It can also be due to allergies to pollens and other particles in the air. It can cause symptoms such as sinus congestion, headache, sore throat, facial swelling and fullness. It may also cause a low-grade fever. No infection is present, and no antibiotic treatment is needed.  Home care  · Drink plenty of water, hot tea, and other liquids. This may help thin mucus. It also may promote sinus drainage.  · Heat may help soothe painful areas of the face. Use a towel soaked in hot water. Or,  the shower and direct the hot spray onto your face. Using a vaporizer along with a menthol rub at night may also help.   · An expectorant containing guaifenesin may help thin the mucus and promote drainage from the sinuses.  · Over-the-counter decongestants may be used unless a similar medicine was prescribed. Nasal sprays work the fastest. Use one that contains phenylephrine or oxymetazoline. First blow the nose gently. Then use the spray. Do not use these medicines more often than directed on the label or symptoms may get worse. You may also use tablets containing pseudoephedrine. Avoid products that combine ingredients, because side effects may be increased. Read labels. You can also ask the pharmacist for help. (NOTE: Persons with high blood pressure should not use decongestants. They can raise blood pressure.)  · Over-the-counter antihistamines may help if allergies contributed to your sinusitis.    · Use acetaminophen or ibuprofen to control pain, unless another pain medicine was prescribed. (If you have chronic liver or kidney disease or ever had a stomach ulcer, talk with your doctor before using these medicines. Aspirin should never be used in anyone under 18 years of age  who is ill with a fever. It may cause severe liver damage.)  · Use nasal rinses or irrigation as instructed by your health care provider.  · Don't smoke. This can worsen symptoms.  Follow-up care  Follow up with your healthcare provider or our staff if you are not improving within the next week.  When to seek medical advice  Call your healthcare provider if any of these occur:  · Green or yellow discharge from the nose or into the throat  · Facial pain or headache becoming more severe  · Stiff neck  · Unusual drowsiness or confusion  · Swelling of the forehead or eyelids  · Vision problems, including blurred or double vision  · Fever of 100.4ºF (38ºC) or higher, or as directed by your healthcare provider  · Seizure  · Breathing problems  · Symptoms not resolving within 10 days  Date Last Reviewed: 4/13/2015  © 5274-9083 [employer] [address] [place] [address]. All rights reserved. This information is not intended as a substitute for professional medical care. Always follow your healthcare professional's instructions.        Sinusitis (No Antibiotics)    The sinuses are air-filled spaces within the bones of the face. They connect to the inside of the nose. Sinusitis is an inflammation of the tissue lining the sinus cavity. Sinus inflammation can occur during a cold. It can also be due to allergies to pollens and other particles in the air. It can cause symptoms such as sinus congestion, headache, sore throat, facial swelling and fullness. It may also cause a low-grade fever. No infection is present, and no antibiotic treatment is needed.  Home care  · Drink plenty of water, hot tea, and other liquids. This may help thin mucus. It also may promote sinus drainage.  · Heat may help soothe painful areas of the face. Use a towel soaked in hot water. Or,  the shower and direct the hot spray onto your face. Using a vaporizer along with a menthol rub at night may also  help.   · An expectorant containing guaifenesin may help thin the mucus and promote drainage from the sinuses.  · Over-the-counter decongestants may be used unless a similar medicine was prescribed. Nasal sprays work the fastest. Use one that contains phenylephrine or oxymetazoline. First blow the nose gently. Then use the spray. Do not use these medicines more often than directed on the label or symptoms may get worse. You may also use tablets containing pseudoephedrine. Avoid products that combine ingredients, because side effects may be increased. Read labels. You can also ask the pharmacist for help. (NOTE: Persons with high blood pressure should not use decongestants. They can raise blood pressure.)  · Over-the-counter antihistamines may help if allergies contributed to your sinusitis.    · Use acetaminophen or ibuprofen to control pain, unless another pain medicine was prescribed. (If you have chronic liver or kidney disease or ever had a stomach ulcer, talk with your doctor before using these medicines. Aspirin should never be used in anyone under 18 years of age who is ill with a fever. It may cause severe liver damage.)  · Use nasal rinses or irrigation as instructed by your health care provider.  · Don't smoke. This can worsen symptoms.  Follow-up care  Follow up with your healthcare provider or our staff if you are not improving within the next week.  When to seek medical advice  Call your healthcare provider if any of these occur:  · Green or yellow discharge from the nose or into the throat  · Facial pain or headache becoming more severe  · Stiff neck  · Unusual drowsiness or confusion  · Swelling of the forehead or eyelids  · Vision problems, including blurred or double vision  · Fever of 100.4ºF (38ºC) or higher, or as directed by your healthcare provider  · Seizure  · Breathing problems  · Symptoms not resolving within 10 days  Date Last Reviewed: 4/13/2015  © 4037-0276 [employer] [address]  [place] [address]. All rights reserved. This information is not intended as a substitute for professional medical care. Always follow your healthcare professional's instructions.        Sinusitis (No Antibiotics)    The sinuses are air-filled spaces within the bones of the face. They connect to the inside of the nose. Sinusitis is an inflammation of the tissue lining the sinus cavity. Sinus inflammation can occur during a cold. It can also be due to allergies to pollens and other particles in the air. It can cause symptoms such as sinus congestion, headache, sore throat, facial swelling and fullness. It may also cause a low-grade fever. No infection is present, and no antibiotic treatment is needed.  Home care  · Drink plenty of water, hot tea, and other liquids. This may help thin mucus. It also may promote sinus drainage.  · Heat may help soothe painful areas of the face. Use a towel soaked in hot water. Or,  the shower and direct the hot spray onto your face. Using a vaporizer along with a menthol rub at night may also help.   · An expectorant containing guaifenesin may help thin the mucus and promote drainage from the sinuses.  · Over-the-counter decongestants may be used unless a similar medicine was prescribed. Nasal sprays work the fastest. Use one that contains phenylephrine or oxymetazoline. First blow the nose gently. Then use the spray. Do not use these medicines more often than directed on the label or symptoms may get worse. You may also use tablets containing pseudoephedrine. Avoid products that combine ingredients, because side effects may be increased. Read labels. You can also ask the pharmacist for help. (NOTE: Persons with high blood pressure should not use decongestants. They can raise blood pressure.)  · Over-the-counter antihistamines may help if allergies contributed to your sinusitis.    · Use acetaminophen or ibuprofen to control pain, unless another pain medicine  was prescribed. (If you have chronic liver or kidney disease or ever had a stomach ulcer, talk with your doctor before using these medicines. Aspirin should never be used in anyone under 18 years of age who is ill with a fever. It may cause severe liver damage.)  · Use nasal rinses or irrigation as instructed by your health care provider.  · Don't smoke. This can worsen symptoms.  Follow-up care  Follow up with your healthcare provider or our staff if you are not improving within the next week.  When to seek medical advice  Call your healthcare provider if any of these occur:  · Green or yellow discharge from the nose or into the throat  · Facial pain or headache becoming more severe  · Stiff neck  · Unusual drowsiness or confusion  · Swelling of the forehead or eyelids  · Vision problems, including blurred or double vision  · Fever of 100.4ºF (38ºC) or higher, or as directed by your healthcare provider  · Seizure  · Breathing problems  · Symptoms not resolving within 10 days  Date Last Reviewed: 4/13/2015  © 3853-7601 [employer] [address] [place] [address]. All rights reserved. This information is not intended as a substitute for professional medical care. Always follow your healthcare professional's instructions.      Hold off on antibiotics as this is likely viral until you've had symptoms for 7-10 days or if symptoms improve and then immediately get worse.     Get Mucinex - D OTC and take Twice daily as needed for sinus congestion

## 2019-05-02 ENCOUNTER — TELEPHONE (OUTPATIENT)
Dept: PLASTIC SURGERY | Facility: CLINIC | Age: 24
End: 2019-05-02

## 2019-05-02 NOTE — TELEPHONE ENCOUNTER
I attempted to call pt to reschedule.  Pt was not available at the time of the call.  I left a detailed VM asking pt to call PLS office at his convenience.

## 2019-06-12 ENCOUNTER — OFFICE VISIT (OUTPATIENT)
Dept: PLASTIC SURGERY | Facility: CLINIC | Age: 24
End: 2019-06-12
Payer: COMMERCIAL

## 2019-06-12 VITALS
HEART RATE: 54 BPM | BODY MASS INDEX: 27.9 KG/M2 | DIASTOLIC BLOOD PRESSURE: 63 MMHG | SYSTOLIC BLOOD PRESSURE: 138 MMHG | HEIGHT: 69 IN | WEIGHT: 188.38 LBS

## 2019-06-12 DIAGNOSIS — L90.5 SCAR: Primary | ICD-10-CM

## 2019-06-12 PROCEDURE — 99999 PR PBB SHADOW E&M-EST. PATIENT-LVL III: CPT | Mod: PBBFAC,,, | Performed by: SURGERY

## 2019-06-12 PROCEDURE — 99201 PR OFFICE/OUTPT VISIT,NEW,LEVL I: CPT | Mod: S$GLB,,, | Performed by: SURGERY

## 2019-06-12 PROCEDURE — 99201 PR OFFICE/OUTPT VISIT,NEW,LEVL I: ICD-10-PCS | Mod: S$GLB,,, | Performed by: SURGERY

## 2019-06-12 PROCEDURE — 3008F PR BODY MASS INDEX (BMI) DOCUMENTED: ICD-10-PCS | Mod: CPTII,S$GLB,, | Performed by: SURGERY

## 2019-06-12 PROCEDURE — 3008F BODY MASS INDEX DOCD: CPT | Mod: CPTII,S$GLB,, | Performed by: SURGERY

## 2019-06-12 PROCEDURE — 99999 PR PBB SHADOW E&M-EST. PATIENT-LVL III: ICD-10-PCS | Mod: PBBFAC,,, | Performed by: SURGERY

## 2019-06-12 NOTE — PROGRESS NOTES
Dictation #1  MRN:[id number]  [facility]:[id number]  SP tendon repair.  Comes in for scar revision.  Scars barely visible.  No scar revision indicated

## 2019-10-01 ENCOUNTER — TELEPHONE (OUTPATIENT)
Dept: FAMILY MEDICINE | Facility: CLINIC | Age: 24
End: 2019-10-01

## 2019-10-01 NOTE — TELEPHONE ENCOUNTER
----- Message from Bridget Gatica sent at 10/1/2019 12:37 PM CDT -----  Contact: Yunior Aden  Patient is hanving Hemorrhoids again and would like to know if he needs a new referral to the doctor that he was referred to last year or a few months back or could he just make an appointment?? Please give his mom a call back Bharti'# [phone]  Mom says he needs a sooner appt than November and she says that she will the doctors' name and number that he was referred to last year.

## 2019-10-01 NOTE — TELEPHONE ENCOUNTER
Spoke with patients mom, phone number given to call and schedule with dr page.  Patient verbalized understanding, informed to return call with questions/concerns -DN

## 2019-10-01 NOTE — TELEPHONE ENCOUNTER
Give them infor for Dr Yoo. A referral is not needed. I think she was requesting a visit with me. Ok to mov up if available

## 2019-10-03 ENCOUNTER — OFFICE VISIT (OUTPATIENT)
Dept: SURGERY | Facility: CLINIC | Age: 24
End: 2019-10-03
Payer: COMMERCIAL

## 2019-10-03 VITALS
HEIGHT: 69 IN | DIASTOLIC BLOOD PRESSURE: 68 MMHG | HEART RATE: 62 BPM | WEIGHT: 188 LBS | SYSTOLIC BLOOD PRESSURE: 115 MMHG | BODY MASS INDEX: 27.85 KG/M2 | TEMPERATURE: 98 F

## 2019-10-03 DIAGNOSIS — K64.5 THROMBOSED EXTERNAL HEMORRHOID: Primary | ICD-10-CM

## 2019-10-03 PROCEDURE — 99213 PR OFFICE/OUTPT VISIT, EST, LEVL III, 20-29 MIN: ICD-10-PCS | Mod: 25,S$GLB,, | Performed by: SURGERY

## 2019-10-03 PROCEDURE — 46083 INC THROMBOSED HROID XTRNL: CPT | Mod: S$GLB,,, | Performed by: SURGERY

## 2019-10-03 PROCEDURE — 99213 OFFICE O/P EST LOW 20 MIN: CPT | Mod: 25,S$GLB,, | Performed by: SURGERY

## 2019-10-03 PROCEDURE — 46083 PR INCISE EXTERNAL HEMORRHOID: ICD-10-PCS | Mod: S$GLB,,, | Performed by: SURGERY

## 2019-10-03 NOTE — PROCEDURES
"Incision & Drainage  Date/Time: 10/3/2019 10:45 AM  Performed by: Stacy Yoo MD  Authorized by: Stacy Yoo MD     Time out: Immediately prior to procedure a "time out" was called to verify the correct patient, procedure, equipment, support staff and site/side marked as required.    Consent Done?:  Yes (Written)    Type:  Hematoma  Body area:  Anogenital  Location details:  Perianal  Anesthesia:  Local infiltration  Local anesthetic: lidocaine 1% with epinephrine  Anesthetic total (ml):  2  Scalpel size:  11  Incision type:  Single straight  Complexity:  Simple  Drainage:  Bloody  Drainage amount:  Moderate  Wound treatment:  Incision, wound left open and clot removal  Packing material:  None  Patient tolerance:  Patient tolerated the procedure well with no immediate complications    I&D Thrombosed Ext Hem without complications.      "

## 2019-10-03 NOTE — PROGRESS NOTES
Subjective:       Patient ID: Yunior Crawford is a 23 y.o. male.    Chief Complaint: Other (Eval hemorrhoids )      HPI:  Hemorrhoids: Patient complains of evaluation of possible hemorrhoids. Onset of symptoms was abrupt starting 1 week ago ago with stable course since that time.  He describes symptoms as pain with sitting and painful perianal swelling. Treatment to date has been none. Patient denies family hx of colorectal CA and melena.    Has hx of thrombosed ext hem 2 yrs ago which required I&D.  C/o 1 week hx of painful swelling again but not as bad.  BMs regular, no diarrhea or constipation    Pt has been lifting weights a lot trying to get into .      Review of Systems   Constitutional: Negative for appetite change, chills, fever and unexpected weight change.   HENT: Negative for hearing loss, rhinorrhea, sore throat and voice change.    Eyes: Negative for photophobia and visual disturbance.   Respiratory: Negative for cough, choking and shortness of breath.    Cardiovascular: Negative for chest pain, palpitations and leg swelling.   Gastrointestinal: Positive for rectal pain. Negative for abdominal pain, blood in stool, constipation, diarrhea, nausea and vomiting.   Endocrine: Negative for cold intolerance, heat intolerance, polydipsia and polyuria.   Musculoskeletal: Negative for arthralgias, back pain, joint swelling and neck stiffness.   Skin: Negative for color change, pallor and rash.   Neurological: Negative for dizziness, seizures, syncope and headaches.   Hematological: Negative for adenopathy. Does not bruise/bleed easily.   Psychiatric/Behavioral: Negative for agitation, behavioral problems and confusion.       Objective:      Physical Exam   Constitutional: He appears well-developed and well-nourished.  Non-toxic appearance. No distress.   HENT:   Head: Normocephalic and atraumatic. Head is without abrasion and without laceration.   Right Ear: External ear normal.   Left Ear: External  ear normal.   Nose: Nose normal.   Mouth/Throat: Oropharynx is clear and moist.   Eyes: Pupils are equal, round, and reactive to light. EOM are normal.   Neck: Trachea normal. No tracheal deviation and normal range of motion present. No thyroid mass and no thyromegaly present.   Cardiovascular: Normal rate and regular rhythm.   Pulmonary/Chest: Effort normal. No accessory muscle usage. No tachypnea. No respiratory distress.   Abdominal: Soft. Normal appearance and bowel sounds are normal. He exhibits no distension and no mass. There is no hepatosplenomegaly. There is no tenderness. There is no tenderness at McBurney's point and negative Johnson's sign. No hernia.   Genitourinary: Rectal exam shows external hemorrhoid (small thrombosed) and tenderness.   Lymphadenopathy:     He has no cervical adenopathy.     He has no axillary adenopathy.        Right: No inguinal adenopathy present.        Left: No inguinal adenopathy present.   Neurological: He is alert. Coordination and gait normal.   Skin: Skin is warm and intact.   Psychiatric: He has a normal mood and affect. His speech is normal and behavior is normal.       Assessment/Plan:   Thrombosed external hemorrhoid  -     Incision & Drainage      The thrombosed hemorrhoid is small.  Discussed option of conservative treatment and letting it resolves on its own versus I & D. Patient wants to have I & D today.  He is concern with lifting weights that it may make it worse.    Sitz baths 2-3/d  Keep stools soft      Follow up for F/U - As needed.

## 2019-11-19 ENCOUNTER — HOSPITAL ENCOUNTER (OUTPATIENT)
Dept: RADIOLOGY | Facility: HOSPITAL | Age: 24
Discharge: HOME | End: 2019-11-19
Attending: FAMILY MEDICINE
Payer: COMMERCIAL

## 2019-11-19 ENCOUNTER — OFFICE VISIT (OUTPATIENT)
Dept: FAMILY MEDICINE | Facility: CLINIC | Age: 24
End: 2019-11-19
Payer: COMMERCIAL

## 2019-11-19 VITALS
HEIGHT: 69 IN | HEART RATE: 64 BPM | BODY MASS INDEX: 25.92 KG/M2 | DIASTOLIC BLOOD PRESSURE: 72 MMHG | WEIGHT: 175 LBS | SYSTOLIC BLOOD PRESSURE: 98 MMHG

## 2019-11-19 DIAGNOSIS — M25.674 STIFFNESS OF FOOT, RIGHT: ICD-10-CM

## 2019-11-19 DIAGNOSIS — Z00.00 ANNUAL PHYSICAL EXAM: Primary | ICD-10-CM

## 2019-11-19 PROCEDURE — 73630 X-RAY EXAM OF FOOT: CPT | Mod: TC,PO,RT

## 2019-11-19 PROCEDURE — 99395 PR PREVENTIVE VISIT,EST,18-39: ICD-10-PCS | Mod: S$GLB,,, | Performed by: FAMILY MEDICINE

## 2019-11-19 PROCEDURE — 99395 PREV VISIT EST AGE 18-39: CPT | Mod: S$GLB,,, | Performed by: FAMILY MEDICINE

## 2019-11-19 NOTE — PROGRESS NOTES
SUBJECTIVE:   HPI: Yunior Crawford  is a 24 y.o. male who presents for annual physical .   No chief complaint on file.    Generally healthy young male with no chronic medical problems.  Unsure of family medical history as he is adopted.  He reports no recent dental exam in does not wear glasses.  He exercises regularly.  No history of surgeries or hospitalizations.  He he plans on going to the [employer] soon.  He has some mild concerns of a popping sound in his right foot with running walking.  It occurs intermittently.  There is no pain or swelling. He is noted for the past year.  He runs 2 miles a few times a week.  He does report some numbness in his foot as he does not run for while.        (Not in a hospital admission)  Review of patient's allergies indicates:  No Known Allergies  Current Outpatient Medications on File Prior to Visit   Medication Sig Dispense Refill    ketoconazole (NIZORAL) 2 % shampoo Wash hair with medicated shampoo at least 2x/week - let sit on scalp at least 5 minutes prior to rinsing 120 mL 5     No current facility-administered medications on file prior to visit.      History reviewed. No pertinent past medical history.  Past Surgical History:   Procedure Laterality Date    CLAVICLE SURGERY       Family History   Adopted: Yes   Problem Relation Age of Onset    Psoriasis Neg Hx     Lupus Neg Hx     Suicidality Neg Hx     Eczema Neg Hx      Social History     Tobacco Use    Smoking status: Current Every Day Smoker     Packs/day: 0.50    Smokeless tobacco: Never Used   Substance Use Topics    Alcohol use: Yes     Comment: weekends    Drug use: No      Health Maintenance Topics with due status: Not Due       Topic Last Completion Date    TETANUS VACCINE 06/09/2015     Immunization History   Administered Date(s) Administered    Tdap 06/09/2015       Review of Systems   Constitutional: Negative for fatigue, fever and unexpected weight change.   HENT: Negative for congestion,  "postnasal drip, rhinorrhea and sore throat.    Eyes: Negative for photophobia, pain and visual disturbance.   Respiratory: Negative for cough, shortness of breath and wheezing.    Cardiovascular: Negative for chest pain and palpitations.   Gastrointestinal: Negative for constipation, diarrhea, nausea and vomiting.   Genitourinary: Negative for difficulty urinating, dysuria, frequency, hematuria and urgency.   Musculoskeletal: Negative for arthralgias and myalgias.   Skin: Negative for rash.   Neurological: Negative for dizziness and headaches.   Psychiatric/Behavioral: Negative for sleep disturbance.      OBJECTIVE:      Vitals:    11/19/19 1104   BP: 98/72   Pulse: 64   Weight: 79.4 kg (175 lb)   Height: 5' 9" (1.753 m)     Physical Exam   Constitutional: He appears well-developed and well-nourished.   HENT:   Head: Normocephalic.   Mouth/Throat: Oropharynx is clear and moist.   Eyes: Pupils are equal, round, and reactive to light. Conjunctivae and EOM are normal.   Neck: Normal range of motion. Neck supple. No thyromegaly present.   Cardiovascular: Normal rate and regular rhythm. Exam reveals no friction rub.   No murmur heard.  Pulmonary/Chest: Effort normal and breath sounds normal.   Abdominal: Soft. Bowel sounds are normal. He exhibits no mass. There is no tenderness.   Musculoskeletal: Normal range of motion. He exhibits no edema, tenderness or deformity.   Popping sensation heard when patient flexes his foot on to his toes.   Neurological: No cranial nerve deficit. Coordination normal.   Skin: Skin is warm and dry. No rash noted.   Psychiatric: He has a normal mood and affect.      Assessment:       1. Annual physical exam    2. Stiffness of foot, right        Plan:       Annual physical exam  -     CBC auto differential; Future; Expected date: 11/19/2019  -     Comprehensive metabolic panel; Future; Expected date: 11/19/2019  -     Lipid panel; Future; Expected date: 11/19/2019  -     Urinalysis, Reflex to " Urine Culture Urine, Clean Catch; Future; Expected date: 11/19/2019  -     TSH w/reflex to FT4; Future; Expected date: 11/19/2019  -     Microalbumin/creatinine urine ratio; Future; Expected date: 11/19/2019    Stiffness of foot, right  -     X-Ray Foot Complete 3 view Right; Future; Expected date: 11/19/2019     X-ray of foot is normal and reassurance is provided to patient    Counseled on age and gender appropriate medical preventative services, including cancer screenings, immunizations, overall nutritional health, need for a consistent exercise regimen and an overall push towards maintaining a vigorous and active lifestyle.      Follow up if symptoms worsen or fail to improve.

## 2019-11-20 ENCOUNTER — TELEPHONE (OUTPATIENT)
Dept: FAMILY MEDICINE | Facility: CLINIC | Age: 24
End: 2019-11-20

## 2019-11-20 LAB
ALBUMIN SERPL-MCNC: 4.4 G/DL (ref 3.6–5.1)
ALBUMIN/CREAT UR: 1 MCG/MG CREAT
ALBUMIN/GLOB SERPL: 1.8 (CALC) (ref 1–2.5)
ALP SERPL-CCNC: 88 U/L (ref 40–115)
ALT SERPL-CCNC: 12 U/L (ref 9–46)
APPEARANCE UR: CLEAR
AST SERPL-CCNC: 16 U/L (ref 10–40)
BACTERIA #/AREA URNS HPF: NORMAL /HPF
BACTERIA UR CULT: NORMAL
BASOPHILS # BLD AUTO: 54 CELLS/UL (ref 0–200)
BASOPHILS NFR BLD AUTO: 0.7 %
BILIRUB SERPL-MCNC: 0.5 MG/DL (ref 0.2–1.2)
BILIRUB UR QL STRIP: NEGATIVE
BUN SERPL-MCNC: 19 MG/DL (ref 7–25)
BUN/CREAT SERPL: NORMAL (CALC) (ref 6–22)
CALCIUM SERPL-MCNC: 9.7 MG/DL (ref 8.6–10.3)
CHLORIDE SERPL-SCNC: 103 MMOL/L (ref 98–110)
CHOLEST SERPL-MCNC: 183 MG/DL
CHOLEST/HDLC SERPL: 3.7 (CALC)
CO2 SERPL-SCNC: 30 MMOL/L (ref 20–32)
COLOR UR: YELLOW
CREAT SERPL-MCNC: 1.15 MG/DL (ref 0.6–1.35)
CREAT UR-MCNC: 141 MG/DL (ref 20–320)
EOSINOPHIL # BLD AUTO: 254 CELLS/UL (ref 15–500)
EOSINOPHIL NFR BLD AUTO: 3.3 %
ERYTHROCYTE [DISTWIDTH] IN BLOOD BY AUTOMATED COUNT: 12.8 % (ref 11–15)
GFRSERPLBLD MDRD-ARVRAT: 87 ML/MIN/1.73M2
GLOBULIN SER CALC-MCNC: 2.4 G/DL (CALC) (ref 1.9–3.7)
GLUCOSE SERPL-MCNC: 81 MG/DL (ref 65–99)
GLUCOSE UR QL STRIP: NEGATIVE
HCT VFR BLD AUTO: 43.5 % (ref 38.5–50)
HDLC SERPL-MCNC: 50 MG/DL
HGB BLD-MCNC: 15.3 G/DL (ref 13.2–17.1)
HGB UR QL STRIP: NEGATIVE
HYALINE CASTS #/AREA URNS LPF: NORMAL /LPF
KETONES UR QL STRIP: NEGATIVE
LDLC SERPL CALC-MCNC: 112 MG/DL (CALC)
LEUKOCYTE ESTERASE UR QL STRIP: NEGATIVE
LYMPHOCYTES # BLD AUTO: 2941 CELLS/UL (ref 850–3900)
LYMPHOCYTES NFR BLD AUTO: 38.2 %
MCH RBC QN AUTO: 31.4 PG (ref 27–33)
MCHC RBC AUTO-ENTMCNC: 35.2 G/DL (ref 32–36)
MCV RBC AUTO: 89.3 FL (ref 80–100)
MICROALBUMIN UR-MCNC: 0.2 MG/DL
MONOCYTES # BLD AUTO: 655 CELLS/UL (ref 200–950)
MONOCYTES NFR BLD AUTO: 8.5 %
NEUTROPHILS # BLD AUTO: 3796 CELLS/UL (ref 1500–7800)
NEUTROPHILS NFR BLD AUTO: 49.3 %
NITRITE UR QL STRIP: NEGATIVE
NONHDLC SERPL-MCNC: 133 MG/DL (CALC)
PH UR STRIP: 6.5 [PH] (ref 5–8)
PLATELET # BLD AUTO: 269 THOUSAND/UL (ref 140–400)
PMV BLD REES-ECKER: 10 FL (ref 7.5–12.5)
POTASSIUM SERPL-SCNC: 4.4 MMOL/L (ref 3.5–5.3)
PROT SERPL-MCNC: 6.8 G/DL (ref 6.1–8.1)
PROT UR QL STRIP: NEGATIVE
RBC # BLD AUTO: 4.87 MILLION/UL (ref 4.2–5.8)
RBC #/AREA URNS HPF: NORMAL /HPF
SODIUM SERPL-SCNC: 141 MMOL/L (ref 135–146)
SP GR UR STRIP: 1.02 (ref 1–1.03)
SQUAMOUS #/AREA URNS HPF: NORMAL /HPF
TRIGL SERPL-MCNC: 99 MG/DL
TSH SERPL-ACNC: 1.56 MIU/L (ref 0.4–4.5)
WBC # BLD AUTO: 7.7 THOUSAND/UL (ref 3.8–10.8)
WBC #/AREA URNS HPF: NORMAL /HPF

## 2019-11-20 NOTE — TELEPHONE ENCOUNTER
----- Message from Nazia Cardozo MD sent at 11/19/2019  5:37 PM CST -----  Please inform patient that xray is normal..

## 2019-11-26 ENCOUNTER — TELEPHONE (OUTPATIENT)
Dept: FAMILY MEDICINE | Facility: CLINIC | Age: 24
End: 2019-11-26

## 2019-12-16 ENCOUNTER — TELEPHONE (OUTPATIENT)
Dept: ORTHOPEDICS | Facility: CLINIC | Age: 24
End: 2019-12-16

## 2019-12-16 NOTE — TELEPHONE ENCOUNTER
----- Message from Karina Che sent at 12/16/2019  3:49 PM CST -----  Pt requesting a note stating that his hand is fine and cleared. Pt asked for a call back to make sure that the the notes says the correct thing.        Pt contact # [phone].      Thanks

## 2019-12-19 ENCOUNTER — OFFICE VISIT (OUTPATIENT)
Dept: ORTHOPEDICS | Facility: CLINIC | Age: 24
End: 2019-12-19
Payer: COMMERCIAL

## 2019-12-19 VITALS — HEIGHT: 69 IN | WEIGHT: 175 LBS | BODY MASS INDEX: 25.92 KG/M2

## 2019-12-19 DIAGNOSIS — M25.641 STIFFNESS OF RIGHT HAND JOINT: Primary | ICD-10-CM

## 2019-12-19 DIAGNOSIS — S61.401A FLEXOR TENDON LACERATION OF HAND WITH OPEN WOUND, RIGHT, INITIAL ENCOUNTER: ICD-10-CM

## 2019-12-19 DIAGNOSIS — S66.821A FLEXOR TENDON LACERATION OF HAND WITH OPEN WOUND, RIGHT, INITIAL ENCOUNTER: ICD-10-CM

## 2019-12-19 PROCEDURE — 3008F PR BODY MASS INDEX (BMI) DOCUMENTED: ICD-10-PCS | Mod: CPTII,S$GLB,, | Performed by: ORTHOPAEDIC SURGERY

## 2019-12-19 PROCEDURE — 99999 PR PBB SHADOW E&M-EST. PATIENT-LVL II: CPT | Mod: PBBFAC,,, | Performed by: ORTHOPAEDIC SURGERY

## 2019-12-19 PROCEDURE — 3008F BODY MASS INDEX DOCD: CPT | Mod: CPTII,S$GLB,, | Performed by: ORTHOPAEDIC SURGERY

## 2019-12-19 PROCEDURE — 99999 PR PBB SHADOW E&M-EST. PATIENT-LVL II: ICD-10-PCS | Mod: PBBFAC,,, | Performed by: ORTHOPAEDIC SURGERY

## 2019-12-19 PROCEDURE — 99213 PR OFFICE/OUTPT VISIT, EST, LEVL III, 20-29 MIN: ICD-10-PCS | Mod: S$GLB,,, | Performed by: ORTHOPAEDIC SURGERY

## 2019-12-19 PROCEDURE — 99213 OFFICE O/P EST LOW 20 MIN: CPT | Mod: S$GLB,,, | Performed by: ORTHOPAEDIC SURGERY

## 2019-12-19 NOTE — PROGRESS NOTES
"Subjective:      Patient ID: Yunior Crawford is a 24 y.o. male.  Chief Complaint: Follow-up of the Right Hand      HPI  Yunior Crawford is a  24 y.o. male presenting today for follow up of tendon and nerve repair of the right ring and small finger.  He reports that he is doing well no pain reported he is back doing all of his regular activities.    Review of patient's allergies indicates:  No Known Allergies      Current Outpatient Medications   Medication Sig Dispense Refill    ketoconazole (NIZORAL) 2 % shampoo Wash hair with medicated shampoo at least 2x/week - let sit on scalp at least 5 minutes prior to rinsing 120 mL 5     No current facility-administered medications for this visit.        No past medical history on file.    Past Surgical History:   Procedure Laterality Date    CLAVICLE SURGERY         OBJECTIVE:   PHYSICAL EXAM:  Height: 5' 9" (175.3 cm) Weight: 79.4 kg (175 lb)  Vitals:    12/19/19 1349   Weight: 79.4 kg (175 lb)   Height: 5' 9" (1.753 m)   PainSc: 0-No pain     Ortho/SPM Exam  Examination right hand no tenderness no swelling sensation intact good strength in the right hand with excellent range of motion    RADIOGRAPHS:  None  Comments: I have personally reviewed the imaging and I agree with the above radiologist's report.    ASSESSMENT/PLAN:     IMPRESSION:  Status post tendon nerve repair right hand    PLAN:  He is cleared for full activities full duty work    FOLLOW UP:  As needed only    Disclaimer: This note has been generated using voice-recognition software. There may be typographical errors that have been missed during proof-reading.  "

## 2020-01-02 ENCOUNTER — OFFICE VISIT (OUTPATIENT)
Dept: FAMILY MEDICINE | Facility: CLINIC | Age: 25
End: 2020-01-02
Payer: COMMERCIAL

## 2020-01-02 VITALS
BODY MASS INDEX: 27.25 KG/M2 | SYSTOLIC BLOOD PRESSURE: 120 MMHG | HEART RATE: 68 BPM | DIASTOLIC BLOOD PRESSURE: 76 MMHG | WEIGHT: 184 LBS | HEIGHT: 69 IN

## 2020-01-02 DIAGNOSIS — K64.8 PROLAPSED HEMORRHOIDS: Primary | ICD-10-CM

## 2020-01-02 PROCEDURE — 99213 PR OFFICE/OUTPT VISIT, EST, LEVL III, 20-29 MIN: ICD-10-PCS | Mod: S$GLB,,, | Performed by: FAMILY MEDICINE

## 2020-01-02 PROCEDURE — 3008F PR BODY MASS INDEX (BMI) DOCUMENTED: ICD-10-PCS | Mod: S$GLB,,, | Performed by: FAMILY MEDICINE

## 2020-01-02 PROCEDURE — 99213 OFFICE O/P EST LOW 20 MIN: CPT | Mod: S$GLB,,, | Performed by: FAMILY MEDICINE

## 2020-01-02 PROCEDURE — 3008F BODY MASS INDEX DOCD: CPT | Mod: S$GLB,,, | Performed by: FAMILY MEDICINE

## 2020-01-02 RX ORDER — HYDROCORTISONE ACETATE 25 MG/1
25 SUPPOSITORY RECTAL 2 TIMES DAILY
Qty: 20 SUPPOSITORY | Refills: 0 | Status: SHIPPED | OUTPATIENT
Start: 2020-01-02 | End: 2020-01-12

## 2020-01-02 NOTE — PROGRESS NOTES
SUBJECTIVE:    Patient ID: Yunior Crawford is a 24 y.o. male.    Chief Complaint: internal hemorhoid    Patient has had recurrent issues with thrombosed hemorrhoids.  He had a large external hemorrhoid excised in 2017 and a recent thrombosed hemorrhoid IND is in the office with Dr. Yoo 10/2019.  He has had troubles since he was a teenager.  He currently reports seeing blood with all bowel movements.  He denies straining and he denies hard stools.  He has no itching or burning.  He does at times feel prolapsed hemorrhoids and has to push them back in.  He has some concerns as he is working out and preparing to go to the .  He does not want this to be a problem 1 season listed.  He thinks that his increased exercise and weightlifting maybe slightly contributing to his problem.  However he would like some definitive therapy for what he considers and embarrassing and uncomfortable condition.        History reviewed. No pertinent past medical history.  Past Surgical History:   Procedure Laterality Date    CLAVICLE SURGERY       Family History   Adopted: Yes   Problem Relation Age of Onset    Psoriasis Neg Hx     Lupus Neg Hx     Suicidality Neg Hx     Eczema Neg Hx        Marital Status: Single  Alcohol History:  reports that he drinks alcohol.  Tobacco History:  reports that he has been smoking. He has been smoking about 0.50 packs per day. He has never used smokeless tobacco.  Drug History:  reports that he does not use drugs.    Review of patient's allergies indicates:  No Known Allergies    Current Outpatient Medications:     hydrocortisone (ANUSOL-HC) 25 mg suppository, Place 1 suppository (25 mg total) rectally 2 (two) times daily. for 10 days, Disp: 20 suppository, Rfl: 0    ketoconazole (NIZORAL) 2 % shampoo, Wash hair with medicated shampoo at least 2x/week - let sit on scalp at least 5 minutes prior to rinsing, Disp: 120 mL, Rfl: 5    Review of Systems   All other systems reviewed and  "are negative.         Objective:      Vitals:    01/02/20 1634   BP: 120/76   Pulse: 68   Weight: 83.5 kg (184 lb)   Height: 5' 9" (1.753 m)     Physical Exam   Constitutional: He appears well-developed and well-nourished.   Cardiovascular: Normal rate and regular rhythm.   Pulmonary/Chest: Effort normal and breath sounds normal.   Abdominal: Soft. Bowel sounds are normal.   Rectal exam deferred at this time   Vitals reviewed.        Assessment:       1. Prolapsed hemorrhoids         Plan:       Prolapsed hemorrhoids  -     hydrocortisone (ANUSOL-HC) 25 mg suppository; Place 1 suppository (25 mg total) rectally 2 (two) times daily. for 10 days  Dispense: 20 suppository; Refill: 0  -     Ambulatory Referral to Colorectal Surgery     The patient counseled on use of stool softeners and high-fiber diet as well.  Follow up if symptoms worsen or fail to improve.            "

## 2020-01-07 ENCOUNTER — TELEPHONE (OUTPATIENT)
Dept: SURGERY | Facility: CLINIC | Age: 25
End: 2020-01-07

## 2020-01-07 NOTE — TELEPHONE ENCOUNTER
----- Message from Teena Calderon sent at 1/7/2020 10:57 AM CST -----  Contact: Patient  Type:  Sooner Apoointment Request    Caller is requesting a sooner appointment.  Caller declined first available appointment listed below.  Caller will not accept being placed on the waitlist and is requesting a message be sent to doctor.    Name of Caller:  Patient  When is the first available appointment?  2/4/20  Symptoms:  Colon surgery  Best Call Back Number:    Additional Information:  Calling to schedule a sooner consult if possible from referral. He advised he needs to be seen ASAP.

## 2020-01-15 ENCOUNTER — OFFICE VISIT (OUTPATIENT)
Dept: SURGERY | Facility: CLINIC | Age: 25
End: 2020-01-15
Payer: COMMERCIAL

## 2020-01-15 VITALS
BODY MASS INDEX: 27.79 KG/M2 | HEART RATE: 69 BPM | TEMPERATURE: 98 F | HEIGHT: 69 IN | DIASTOLIC BLOOD PRESSURE: 68 MMHG | WEIGHT: 187.63 LBS | SYSTOLIC BLOOD PRESSURE: 126 MMHG

## 2020-01-15 DIAGNOSIS — K64.8 HEMORRHOID PROLAPSE: Primary | ICD-10-CM

## 2020-01-15 PROCEDURE — 99244 PR OFFICE CONSULTATION,LEVEL IV: ICD-10-PCS | Mod: 25,S$GLB,, | Performed by: SURGERY

## 2020-01-15 PROCEDURE — 99999 PR PBB SHADOW E&M-EST. PATIENT-LVL III: CPT | Mod: PBBFAC,,, | Performed by: SURGERY

## 2020-01-15 PROCEDURE — 99244 OFF/OP CNSLTJ NEW/EST MOD 40: CPT | Mod: 25,S$GLB,, | Performed by: SURGERY

## 2020-01-15 PROCEDURE — 99999 PR PBB SHADOW E&M-EST. PATIENT-LVL III: ICD-10-PCS | Mod: PBBFAC,,, | Performed by: SURGERY

## 2020-01-15 PROCEDURE — 46221 LIGATION OF HEMORRHOID(S): CPT | Mod: S$GLB,,, | Performed by: SURGERY

## 2020-01-15 PROCEDURE — 46221 PR HEMORRHOIDECTOMY INTERNAL RUBBER BAND LIGATIONS: ICD-10-PCS | Mod: S$GLB,,, | Performed by: SURGERY

## 2020-01-15 NOTE — LETTER
January 15, 2020      Nazia Cardozo MD  [address]  [address]  [place] [address]           [facility]  [address] [place] [address]  Phone: [phone]          Patient: Yunior Crawford   MR Number: [id number]   YOB: 1995   Date of Visit: 1/15/2020       Dear Dr. Nazia Cardozo:    Thank you for referring Yunior Crawford to me for evaluation. Attached you will find relevant portions of my assessment and plan of care.    If you have questions, please do not hesitate to call me. I look forward to following Yunior Crawford along with you.    Sincerely,    Pradip Strong MD    Enclosure  CC:  No Recipients    If you would like to receive this communication electronically, please contact [email or web address] or [phone] to request more information on [employer] Link access.    For providers and/or their staff who would like to refer a patient to [facility], please contact us through our one-stop-shop provider referral line, [facility], at [phone].    If you feel you have received this communication in error or would no longer like to receive these types of communications, please e-mail [email or web address]

## 2020-01-15 NOTE — Clinical Note
I saw your patient, Yunior Soliman Ty in the office.  Attached are my findings and plan.  Thank you for referring him to my office and if you have any questions please do not hesitate to call my cell [phone].Roland Strong

## 2020-01-17 ENCOUNTER — TELEPHONE (OUTPATIENT)
Dept: SURGERY | Facility: CLINIC | Age: 25
End: 2020-01-17

## 2020-01-17 NOTE — TELEPHONE ENCOUNTER
----- Message from Alyson Colindres sent at 1/17/2020 10:20 AM CST -----  Contact: self [phone]  Patient is requesting a call back from the nurse stated he have questions concerning his surgery.    Please call the patient upon request at phone number [phone].

## 2020-12-22 ENCOUNTER — TELEPHONE (OUTPATIENT)
Dept: SURGERY | Facility: CLINIC | Age: 25
End: 2020-12-22

## 2020-12-22 NOTE — TELEPHONE ENCOUNTER
----- Message from Corrie Hathaway sent at 12/22/2020  8:08 AM CST -----  Type: Needs Medical Advice    Who Called:  Patient  Best Call Back Number: [phone]  Additional Information:  Requesting to speak with nurse or doctor concerning recurring issues he is having with hemorrhoids/needs advice/please call back to advise.

## 2020-12-22 NOTE — TELEPHONE ENCOUNTER
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 Call back prn any question or concern.

## 2022-03-01 NOTE — PROGRESS NOTES
HISTORY OF PRESENT ILLNESS:  Mr. Crawford in followup of flexion tendon repair to   the right ring and small finger.  He is about five weeks' postop.  He is doing   well, currently in therapy in [place] where he lives, does not have the splint   on today, but he does have a splint.    PHYSICAL EXAMINATION:  RIGHT HAND:  The fingers look good.  Incisions well healed.  He does have some   active flexion, a little bit of limited DIP flexion of the ring finger, but   seems to be having a normal posture there.    PLAN:  Continue therapy.  I have cautioned him about overuse.  Continue with the   splint as much as possible, especially when he leaves the house.  Advil for   pain.  I have ordered some Mederma scar cream for topical use.  Follow up in   three weeks.      SUNG  dd: 02/26/2018 10:08:17 (CST)  td: 02/27/2018 05:09:57 (CST)  Doc ID   #[id number]  Job ID #[id number]    CC:        Quality 226: Preventive Care And Screening: Tobacco Use: Screening And Cessation Intervention: Patient screened for tobacco use and is an ex/non-smoker Quality 431: Preventive Care And Screening: Unhealthy Alcohol Use - Screening: Patient not identified as an unhealthy alcohol user when screened for unhealthy alcohol use using a systematic screening method Quality 110: Preventive Care And Screening: Influenza Immunization: Influenza Immunization Administered during Influenza season Quality 130: Documentation Of Current Medications In The Medical Record: Current Medications Documented Detail Level: Detailed

## 2022-06-29 ENCOUNTER — TELEPHONE (OUTPATIENT)
Dept: FAMILY MEDICINE | Facility: CLINIC | Age: 27
End: 2022-06-29

## 2022-06-29 ENCOUNTER — TELEPHONE (OUTPATIENT)
Dept: SURGERY | Facility: CLINIC | Age: 27
End: 2022-06-29
Payer: COMMERCIAL

## 2022-06-29 NOTE — TELEPHONE ENCOUNTER
----- Message from Nata Perla sent at 6/29/2022  4:39 PM CDT -----  Contact: Bharti Ferguson  Type: Needs Medical Advice    Who Called:  Mom    Best Call Back Number: [phone] or [phone] or pt [phone]    Additional Information: Mom wants to know if Dr. Strong does laser surgery for internal hemorrhoids.  Please call back.  Thanks.

## 2022-06-29 NOTE — TELEPHONE ENCOUNTER
I called and spoke to patients mother to inform her that Dr. Strong doesn't do laser hemorrhoid removal.  I scheduled him to see Dr. Strong on Tuesday, 7/5/22 @ 3:30pm in [facility].  Carylle

## 2022-06-29 NOTE — TELEPHONE ENCOUNTER
Per Dr Cardozo patient has seen dr aime dumas for this in the past, contact dr dumas.     Spoke with patients mother notified of instruction per dr cardozo.  Mrs Crawford verbalized understanding -DN

## 2022-06-29 NOTE — TELEPHONE ENCOUNTER
----- Message from Carla Cruz sent at 6/29/2022 10:17 AM CDT -----  Pt's mother calling said he has some internal Hemorid that need to be checked he has had  them clipped in the past through the . He is not comfortable seeing a female provider would like to see Alberto or any one of the male providers for this issue.  # [phone]

## 2022-06-30 ENCOUNTER — TELEPHONE (OUTPATIENT)
Dept: SURGERY | Facility: CLINIC | Age: 27
End: 2022-06-30
Payer: COMMERCIAL

## 2022-06-30 NOTE — TELEPHONE ENCOUNTER
----- Message from Aura Barahona sent at 6/30/2022  8:44 AM CDT -----  Contact: Mom Bharti  Patients mother spoke to Carlyle yesterday and was asked to contact her sons insurance and she did and they are asking for the code of the procedure and if it is being done in the office.  Please call Bharti back with this information so she can call the insurance company back, at [phone]  and thanks

## 2022-06-30 NOTE — TELEPHONE ENCOUNTER
I called and gave patients mother the code for banding 85210.  She'll call [employer] to give them the code for in the clinic.  Carlyle

## 2023-02-20 NOTE — TELEPHONE ENCOUNTER
----- Message from Nazia Cardozo MD sent at 11/25/2019  7:04 PM CST -----  Please inform patient that labs are normal. Please continue current therapy.   [id number]

## 2023-06-03 NOTE — TELEPHONE ENCOUNTER
----- Message from Jennifer Carey sent at 1/19/2018  8:45 AM CST -----  Contact: Bharti (mother)/ [phone]  Patients mother called in to let you know [employer] authorization department is open and you can get the authorization before patients procedure.    Please call and advise.   no

## 2023-08-18 ENCOUNTER — OFFICE VISIT (OUTPATIENT)
Dept: URGENT CARE | Facility: CLINIC | Age: 28
End: 2023-08-18
Payer: COMMERCIAL

## 2023-08-18 VITALS
BODY MASS INDEX: 25.18 KG/M2 | HEART RATE: 89 BPM | DIASTOLIC BLOOD PRESSURE: 69 MMHG | TEMPERATURE: 99 F | RESPIRATION RATE: 16 BRPM | HEIGHT: 69 IN | OXYGEN SATURATION: 98 % | SYSTOLIC BLOOD PRESSURE: 108 MMHG | WEIGHT: 170 LBS

## 2023-08-18 DIAGNOSIS — R05.9 COUGH, UNSPECIFIED TYPE: ICD-10-CM

## 2023-08-18 DIAGNOSIS — U07.1 COVID-19: Primary | ICD-10-CM

## 2023-08-18 LAB
CTP QC/QA: YES
CTP QC/QA: YES
FLUAV AG NPH QL: NEGATIVE
FLUBV AG NPH QL: NEGATIVE
SARS-COV-2 AG RESP QL IA.RAPID: POSITIVE

## 2023-08-18 PROCEDURE — 87804 INFLUENZA ASSAY W/OPTIC: CPT | Mod: 59,QW,,

## 2023-08-18 PROCEDURE — 87804 POCT INFLUENZA A/B: ICD-10-PCS | Mod: 59,QW,,

## 2023-08-18 PROCEDURE — 87811 SARS-COV-2 COVID19 W/OPTIC: CPT | Mod: QW,S$GLB,,

## 2023-08-18 PROCEDURE — 99204 PR OFFICE/OUTPT VISIT, NEW, LEVL IV, 45-59 MIN: ICD-10-PCS | Mod: S$GLB,,,

## 2023-08-18 PROCEDURE — 87811 SARS CORONAVIRUS 2 ANTIGEN POCT, MANUAL READ: ICD-10-PCS | Mod: QW,S$GLB,,

## 2023-08-18 PROCEDURE — 99204 OFFICE O/P NEW MOD 45 MIN: CPT | Mod: S$GLB,,,

## 2023-08-18 NOTE — LETTER
August 18, 2023      [facility]  [address] [address]       Patient: Yunior Crawford   YOB: 1995  Date of Visit: 08/18/2023    To Whom It May Concern:    Jones Crawford  was at [facility] on 08/18/2023. The patient may return to work/school on 8/23/23 with no restrictions. If you have any questions or concerns, or if I can be of further assistance, please do not hesitate to contact me.  May return to work sooner if 24 hours symptom-free  Sincerely,    MARY Yoder

## 2023-08-18 NOTE — PROGRESS NOTES
"Subjective:      Patient ID: Yunior Crawford is a 27 y.o. male.    Vitals:  height is 5' 9" (1.753 m) and weight is 77.1 kg (170 lb). His temperature is 98.9 °F (37.2 °C). His blood pressure is 108/69 and his pulse is 89. His respiration is 16 and oxygen saturation is 98%.     Chief Complaint: Cough    Guarded, has no significant past medical history, presents to clinic with a chief complaint of headache for 2 days.  Patient reports he was exposed to COVID at work.  He is not COVID vaccinated.  He denies any other symptoms.    Cough  This is a new problem. Episode onset: x 2 days. The problem has been gradually worsening. Associated symptoms include headaches. Pertinent negatives include no chest pain, chills, ear pain, eye redness, fever, nasal congestion, postnasal drip, rash, sore throat or shortness of breath. Nothing aggravates the symptoms. He has tried nothing for the symptoms. There is no history of environmental allergies.       Constitution: Negative for chills and fever.   HENT:  Negative for ear pain, postnasal drip and sore throat.    Neck: Negative for painful lymph nodes.   Cardiovascular:  Negative for chest pain.   Eyes:  Negative for eye itching, eye pain and eye redness.   Respiratory:  Negative for cough and shortness of breath.    Gastrointestinal:  Negative for abdominal pain, nausea, vomiting and diarrhea.   Endocrine: cold intolerance and heat intolerance.   Genitourinary:  Negative for dysuria and frequency.   Musculoskeletal:  Negative for pain.   Skin:  Negative for rash.   Allergic/Immunologic: Negative for environmental allergies and immunizations up-to-date.   Neurological:  Positive for headaches. Negative for altered mental status.   Hematologic/Lymphatic: Negative for swollen lymph nodes.   Psychiatric/Behavioral:  Negative for altered mental status.       Objective:     Physical Exam   Constitutional: He is oriented to person, place, and time. He appears well-developed. He is " cooperative.  Non-toxic appearance. He does not appear ill. No distress. normal  HENT:   Head: Normocephalic and atraumatic.   Ears:   Right Ear: Hearing, tympanic membrane, external ear and ear canal normal.   Left Ear: Hearing and external ear normal. impacted cerumen  Nose: Nose normal. No mucosal edema or nasal deformity. No epistaxis. Right sinus exhibits no maxillary sinus tenderness and no frontal sinus tenderness. Left sinus exhibits no maxillary sinus tenderness and no frontal sinus tenderness.   Mouth/Throat: Uvula is midline, oropharynx is clear and moist and mucous membranes are normal. Mucous membranes are moist. No trismus in the jaw. Normal dentition. No uvula swelling. Oropharynx is clear.   Eyes: Conjunctivae and lids are normal. Pupils are equal, round, and reactive to light. Extraocular movement intact   Neck: Trachea normal and phonation normal. Neck supple.   Cardiovascular: Normal rate, regular rhythm, normal heart sounds and normal pulses.   Pulmonary/Chest: Effort normal and breath sounds normal.   Abdominal: Normal appearance. Soft. flat abdomen There is no abdominal tenderness. There is no left CVA tenderness and no right CVA tenderness.   Musculoskeletal: Normal range of motion.         General: Normal range of motion.   Lymphadenopathy:     He has no cervical adenopathy.   Neurological: no focal deficit. He is alert and oriented to person, place, and time. He exhibits normal muscle tone.   Skin: Skin is warm, dry, intact and not diaphoretic. Capillary refill takes 2 to 3 seconds.   Psychiatric: His speech is normal and behavior is normal. Mood, judgment and thought content normal.   Nursing note and vitals reviewed.    1 COVID risk score    Assessment:     1. COVID-19    2. Cough, unspecified type        Plan:       COVID-19    Cough, unspecified type  -     SARS Coronavirus 2 Antigen, POCT Manual Read  -     POCT Influenza A/B Rapid Antigen      Mineral oil or Debrox nightly for cerumen  impactions  Paxlovid offered and refused   OTC medications for symptom control

## 2023-11-06 ENCOUNTER — PATIENT MESSAGE (OUTPATIENT)
Dept: SURGERY | Facility: CLINIC | Age: 28
End: 2023-11-06
Payer: COMMERCIAL

## 2023-11-07 ENCOUNTER — TELEPHONE (OUTPATIENT)
Dept: SURGERY | Facility: CLINIC | Age: 28
End: 2023-11-07
Payer: COMMERCIAL

## 2023-11-07 NOTE — TELEPHONE ENCOUNTER
----- Message from Luanne Fitzpatrick LPN sent at 11/6/2023  4:05 PM CST -----  Contact: self    ----- Message -----  From: Michele Rodríguez  Sent: 11/6/2023   3:52 PM CST  To: [facility] Clinical Support    Type: Needs Medical Advice  Who Called:  Patient    Best Call Back Number: [phone]    Additional Information: Pt states he would like to speak with office has some questions about a procedure.Please call back

## 2023-11-07 NOTE — TELEPHONE ENCOUNTER
I called patient to schedule him with Dr. Strong on Wednesday, 12/13/23 @ 1pm in [place] to discuss hemorrhoid surgery.  Carlyle

## 2023-11-29 ENCOUNTER — TELEPHONE (OUTPATIENT)
Dept: SURGERY | Facility: CLINIC | Age: 28
End: 2023-11-29
Payer: COMMERCIAL

## 2023-11-29 NOTE — TELEPHONE ENCOUNTER
LMOR @ 5:54pm for patient to call back about his appointment on Wednesday, 12/13/23 @ 1pm with Dr. Strong.  I have to reschedule due to Dr. Strong being out of the office. Carlyle

## 2023-11-29 NOTE — TELEPHONE ENCOUNTER
I called patients mother to inform her that Dr. Strong will be out of the office on Wednesday, 12/13/23 and we have to reschedule her sons appointment to another day.  Carlyle

## 2023-11-30 ENCOUNTER — TELEPHONE (OUTPATIENT)
Dept: SURGERY | Facility: CLINIC | Age: 28
End: 2023-11-30
Payer: COMMERCIAL

## 2023-11-30 ENCOUNTER — OFFICE VISIT (OUTPATIENT)
Dept: SURGERY | Facility: CLINIC | Age: 28
End: 2023-11-30
Payer: COMMERCIAL

## 2023-11-30 VITALS
DIASTOLIC BLOOD PRESSURE: 71 MMHG | HEIGHT: 69 IN | TEMPERATURE: 98 F | HEART RATE: 58 BPM | WEIGHT: 170 LBS | BODY MASS INDEX: 25.18 KG/M2 | SYSTOLIC BLOOD PRESSURE: 119 MMHG

## 2023-11-30 DIAGNOSIS — K62.89 RECTAL PAIN: Primary | ICD-10-CM

## 2023-11-30 PROCEDURE — 3074F SYST BP LT 130 MM HG: CPT | Mod: CPTII,S$GLB,, | Performed by: STUDENT IN AN ORGANIZED HEALTH CARE EDUCATION/TRAINING PROGRAM

## 2023-11-30 PROCEDURE — 3008F PR BODY MASS INDEX (BMI) DOCUMENTED: ICD-10-PCS | Mod: CPTII,S$GLB,, | Performed by: STUDENT IN AN ORGANIZED HEALTH CARE EDUCATION/TRAINING PROGRAM

## 2023-11-30 PROCEDURE — 3008F BODY MASS INDEX DOCD: CPT | Mod: CPTII,S$GLB,, | Performed by: STUDENT IN AN ORGANIZED HEALTH CARE EDUCATION/TRAINING PROGRAM

## 2023-11-30 PROCEDURE — 3074F PR MOST RECENT SYSTOLIC BLOOD PRESSURE < 130 MM HG: ICD-10-PCS | Mod: CPTII,S$GLB,, | Performed by: STUDENT IN AN ORGANIZED HEALTH CARE EDUCATION/TRAINING PROGRAM

## 2023-11-30 PROCEDURE — 99203 PR OFFICE/OUTPT VISIT, NEW, LEVL III, 30-44 MIN: ICD-10-PCS | Mod: S$GLB,,, | Performed by: STUDENT IN AN ORGANIZED HEALTH CARE EDUCATION/TRAINING PROGRAM

## 2023-11-30 PROCEDURE — 3078F PR MOST RECENT DIASTOLIC BLOOD PRESSURE < 80 MM HG: ICD-10-PCS | Mod: CPTII,S$GLB,, | Performed by: STUDENT IN AN ORGANIZED HEALTH CARE EDUCATION/TRAINING PROGRAM

## 2023-11-30 PROCEDURE — 99203 OFFICE O/P NEW LOW 30 MIN: CPT | Mod: S$GLB,,, | Performed by: STUDENT IN AN ORGANIZED HEALTH CARE EDUCATION/TRAINING PROGRAM

## 2023-11-30 PROCEDURE — 3078F DIAST BP <80 MM HG: CPT | Mod: CPTII,S$GLB,, | Performed by: STUDENT IN AN ORGANIZED HEALTH CARE EDUCATION/TRAINING PROGRAM

## 2023-11-30 NOTE — TELEPHONE ENCOUNTER
CELY @ 4pm for patient to call back to schedule an appointment to see Dr. Strong on Tuesday, 12/6/23 @ 10:45am in [place].  Carlyle  
I called patients Mother and she stated that he'll call me back.  Carlyle  
Patient called back and I informed him that I scheduled him to see Dr. Strong on Wednesday, 12/6/23 @ 10:45am in [place].  Patient accepted the appointment. Carlyle  
179

## 2023-11-30 NOTE — PROGRESS NOTES
"History & Physical    SUBJECTIVE:     History of Present Illness:  Patient is a 28 y.o. male presents with mostly higher rectal pain.  This is been going on for many years.  He is always just ignored it.  He deals with hemorrhoidal problems.  He is previously undergone hemorrhoidal banding with Dr. Strong.    Chief Complaint   Patient presents with    Consult     Hemorrhoid surgery discussion       Review of patient's allergies indicates:  Not on File    No current outpatient medications on file.     No current facility-administered medications for this visit.       No past medical history on file.  Past Surgical History:   Procedure Laterality Date    CLAVICLE SURGERY      CLAVICLE SURGERY Left      Family History   Adopted: Yes   Problem Relation Age of Onset    Psoriasis Neg Hx     Lupus Neg Hx     Suicidality Neg Hx     Eczema Neg Hx      Social History     Tobacco Use    Smoking status: Every Day     Current packs/day: 0.50     Types: Cigarettes    Smokeless tobacco: Never   Substance Use Topics    Alcohol use: Yes     Comment: weekends    Drug use: No        Review of Systems:  Review of Systems   Constitutional: Negative.  Negative for fatigue and fever.   HENT: Negative.     Eyes: Negative.    Respiratory: Negative.  Negative for shortness of breath.    Cardiovascular: Negative.  Negative for chest pain.   Gastrointestinal:  Positive for rectal pain.   Endocrine: Negative.    Genitourinary: Negative.    Musculoskeletal: Negative.    Skin: Negative.    Allergic/Immunologic: Negative.    Neurological: Negative.    Hematological: Negative.    Psychiatric/Behavioral: Negative.         OBJECTIVE:     Vital Signs (Most Recent)  Temp: 98.3 °F (36.8 °C) (11/30/23 0953)  Pulse: (!) 58 (11/30/23 0953)  BP: 119/71 (11/30/23 0953)  5' 9" (1.753 m)  77.1 kg (170 lb)     Physical Exam:  Physical Exam  Constitutional:       General: He is not in acute distress.     Appearance: Normal appearance. He is not ill-appearing, " toxic-appearing or diaphoretic.   HENT:      Head: Normocephalic.      Nose: Nose normal.   Eyes:      Conjunctiva/sclera: Conjunctivae normal.   Cardiovascular:      Rate and Rhythm: Normal rate and regular rhythm.   Pulmonary:      Effort: Pulmonary effort is normal.   Abdominal:      Palpations: Abdomen is soft.   Genitourinary:     Comments: Some prominent hemorrhoidal tissue was evident externally.  Musculoskeletal:         General: Normal range of motion.      Cervical back: Normal range of motion.   Skin:     General: Skin is warm.   Neurological:      General: No focal deficit present.      Mental Status: He is alert.   Psychiatric:         Mood and Affect: Mood normal.         ASSESSMENT/PLAN:     28-year-old male who is previously undergone hemorrhoidal banding who presents with persistent rectal pain.    PLAN:  Discuss that there is nothing externally that would explain his symptoms.  I recommended again that he start a fiber supplement, especially given his history of prior hemorrhoidal banding.  We discussed additional workup with either a colonoscopy or CT imaging.  CT imaging is likely less useful.  Discussed following up with Dr. Strong who previously performed is hemorrhoidal banding to potentially perform colonoscopy and or additional surgical intervention of his hemorrhoids.

## 2023-11-30 NOTE — TELEPHONE ENCOUNTER
Patient cancelled with Dr. Strong on Wednesday, 12/13/23 @ 1pm and rescheduled with Dr. Monterroso today @ 10am @ [facility] [place].  Carlyle

## 2023-12-06 ENCOUNTER — OFFICE VISIT (OUTPATIENT)
Dept: SURGERY | Facility: CLINIC | Age: 28
End: 2023-12-06
Payer: COMMERCIAL

## 2023-12-06 VITALS
DIASTOLIC BLOOD PRESSURE: 70 MMHG | HEIGHT: 65 IN | TEMPERATURE: 98 F | HEART RATE: 86 BPM | SYSTOLIC BLOOD PRESSURE: 121 MMHG | BODY MASS INDEX: 28.32 KG/M2 | WEIGHT: 170 LBS

## 2023-12-06 DIAGNOSIS — M53.3 PAIN, COCCYX: ICD-10-CM

## 2023-12-06 DIAGNOSIS — K64.8 HEMORRHOID PROLAPSE: Primary | ICD-10-CM

## 2023-12-06 PROCEDURE — 3008F BODY MASS INDEX DOCD: CPT | Mod: CPTII,S$GLB,, | Performed by: SURGERY

## 2023-12-06 PROCEDURE — 99213 OFFICE O/P EST LOW 20 MIN: CPT | Mod: 25,S$GLB,, | Performed by: SURGERY

## 2023-12-06 PROCEDURE — 99999 PR PBB SHADOW E&M-EST. PATIENT-LVL III: CPT | Mod: PBBFAC,,, | Performed by: SURGERY

## 2023-12-06 PROCEDURE — 3078F DIAST BP <80 MM HG: CPT | Mod: CPTII,S$GLB,, | Performed by: SURGERY

## 2023-12-06 PROCEDURE — 99213 PR OFFICE/OUTPT VISIT, EST, LEVL III, 20-29 MIN: ICD-10-PCS | Mod: 25,S$GLB,, | Performed by: SURGERY

## 2023-12-06 PROCEDURE — 3078F PR MOST RECENT DIASTOLIC BLOOD PRESSURE < 80 MM HG: ICD-10-PCS | Mod: CPTII,S$GLB,, | Performed by: SURGERY

## 2023-12-06 PROCEDURE — 3008F PR BODY MASS INDEX (BMI) DOCUMENTED: ICD-10-PCS | Mod: CPTII,S$GLB,, | Performed by: SURGERY

## 2023-12-06 PROCEDURE — 3074F SYST BP LT 130 MM HG: CPT | Mod: CPTII,S$GLB,, | Performed by: SURGERY

## 2023-12-06 PROCEDURE — 46600 DIAGNOSTIC ANOSCOPY SPX: CPT | Mod: S$GLB,,, | Performed by: SURGERY

## 2023-12-06 PROCEDURE — 3074F PR MOST RECENT SYSTOLIC BLOOD PRESSURE < 130 MM HG: ICD-10-PCS | Mod: CPTII,S$GLB,, | Performed by: SURGERY

## 2023-12-06 PROCEDURE — 1159F MED LIST DOCD IN RCRD: CPT | Mod: CPTII,S$GLB,, | Performed by: SURGERY

## 2023-12-06 PROCEDURE — 1159F PR MEDICATION LIST DOCUMENTED IN MEDICAL RECORD: ICD-10-PCS | Mod: CPTII,S$GLB,, | Performed by: SURGERY

## 2023-12-06 PROCEDURE — 99999 PR PBB SHADOW E&M-EST. PATIENT-LVL III: ICD-10-PCS | Mod: PBBFAC,,, | Performed by: SURGERY

## 2023-12-06 PROCEDURE — 46600 PR DIAG2STIC A2SCOPY: ICD-10-PCS | Mod: S$GLB,,, | Performed by: SURGERY

## 2023-12-06 NOTE — PROGRESS NOTES
Subjective     Patient ID: Yunior Crawford is a 28 y.o. male.    Chief Complaint: Follow-up (Rectal bleeding)    HPI   pleasant 28-year-old gentleman who presents to my office for evaluation of hemorrhoids.  Patient notes he has hemorrhoids will often times prolapse.  He notes tissue prolapse frequently with bowel movements.  He has to manually reduced.  This has caused pressure and irritation.  Notes mucus leakage.  Denies any significant bleeding.  Has no other complaints.  He is otherwise healthy.  He has had banding in the past with some slight improvement but that was over 3 years ago.  Review of Systems   Constitutional:  Negative for activity change and appetite change.   Cardiovascular:  Negative for chest pain.   Gastrointestinal:  Negative for abdominal distention, nausea and vomiting.          Objective     Physical Exam  Vitals reviewed.   Cardiovascular:      Rate and Rhythm: Normal rate.      Pulses: Normal pulses.   Abdominal:      General: Abdomen is flat. There is no distension.      Tenderness: There is no abdominal tenderness.      Hernia: No hernia is present.   Genitourinary:     Comments: Ext exam with slight ext hemorrhoids noted in the R post position.  NIKHIL with normal sphincter tone.  Anoscopy demonstrating moderate int hemorrhoids in R atn, R post and L lateral position.    Neurological:      Mental Status: He is alert.            Assessment and Plan     Hemorrhoid prolapse.          D/w pt.  If having pressure and discomfort from the hemorrhoid prolapse could consider banding.  I have offered this at a time that is convenient to him.  He will RTC on 12/20 for banding.  D/w pt also that given persistent pain particularly at tip of coccyx will obtain an MRI to r/o any other abnl         No follow-ups on file.

## 2023-12-11 ENCOUNTER — TELEPHONE (OUTPATIENT)
Dept: SURGERY | Facility: CLINIC | Age: 28
End: 2023-12-11
Payer: COMMERCIAL

## 2023-12-11 NOTE — TELEPHONE ENCOUNTER
I called patient to inform him that he's scheduled for the MRI Pelvis w/wo contrast @ [facility] on Sunday, 12/17/23 @ 10am and needs to arrive @ 9:30am.  He was informed to check in by the E.R.  Patient understood.  Carlyle

## 2023-12-11 NOTE — TELEPHONE ENCOUNTER
I called patient to ask him when he's available to have his MRI scheduled.  He stated that he prefers morning time and he can go any day this week expect for Tuesday.  I informed him that I'll call him back to give him the appointment date and time.  Carlyle

## 2023-12-12 ENCOUNTER — TELEPHONE (OUTPATIENT)
Dept: SURGERY | Facility: CLINIC | Age: 28
End: 2023-12-12
Payer: COMMERCIAL

## 2023-12-12 NOTE — TELEPHONE ENCOUNTER
----- Message from Elizabeth Crenshaw, Patient Care Assistant sent at 12/12/2023  2:26 PM CST -----  Type: Needs Medical Advice  Who Called:  christopher   Best Call Back Number: [phone]  or [phone]      Additional Information: Christopher is wanting to get a order to change location for up coming mri on 12/17 ,to new location [facility] , please call  patient to confirm new time and time of arrival and further discuss, thank you .

## 2023-12-12 NOTE — TELEPHONE ENCOUNTER
I called patients Mother and she stated that her sons MRI was rescheduled to Tuesday, 12/26/23 @ 2pm @ [facility]   [facility], Radiology          Dept Address: [address], [facility] IMAGING [place] [address]       Dept Phone Number: [phone]   She wanted to have it done at [facility] in [place] because it will be cheaper.  She'll call me back tomorrow to let me know if he is going to change to [facility].  If he does then I'll need to have an outside order put in for the MRI Pelvis W/WO Contrast and fax it to [facility]. Patient will have to call to schedule the MRI himself.  Carlyle

## 2023-12-13 ENCOUNTER — TELEPHONE (OUTPATIENT)
Dept: SURGERY | Facility: CLINIC | Age: 28
End: 2023-12-13
Payer: COMMERCIAL

## 2023-12-13 DIAGNOSIS — M53.3 PAIN, COCCYX: Primary | ICD-10-CM

## 2023-12-13 NOTE — TELEPHONE ENCOUNTER
I called and spoke to patients Mother and she wants it scheduled @ [facility] in [place].  She called [phone] and spoke to Alyssa.  She needs us to fax the an order and clinical notes to [phone] for [facility] to call her to schedule the MRI Pelvis W/WO contrast.  Carlyle

## 2023-12-13 NOTE — TELEPHONE ENCOUNTER
----- Message from Maegan Oviedo sent at 12/13/2023  9:04 AM CST -----  Contact: bharti  Type:  Patient Returning Call    Who Called:Bharti    Who Left Message for Patient:Carlyle    Does the patient know what this is regarding?:appt    Would the patient rather a call back or a response via [facility]? Call back    Best Call Back Number:  or     Additional Information: please call to advise  Thanks

## 2023-12-21 ENCOUNTER — TELEPHONE (OUTPATIENT)
Dept: SURGERY | Facility: CLINIC | Age: 28
End: 2023-12-21
Payer: COMMERCIAL

## 2024-01-05 ENCOUNTER — TELEPHONE (OUTPATIENT)
Dept: SURGERY | Facility: CLINIC | Age: 29
End: 2024-01-05
Payer: COMMERCIAL

## 2024-01-05 NOTE — TELEPHONE ENCOUNTER
----- Message from Marcelina Lowry sent at 1/5/2024  9:21 AM CST -----  Regarding: CPT Codes needed  Good morning we received a call requesting an estimate for Mr Pedraza upcoming in office procedure.  Can you please advise of the codes that will be used for his visit so that we may provide an estimate.    Thank you  Central Pricing Office  [phone]

## 2024-01-08 ENCOUNTER — TELEPHONE (OUTPATIENT)
Dept: SURGERY | Facility: CLINIC | Age: 29
End: 2024-01-08
Payer: COMMERCIAL

## 2024-01-08 NOTE — TELEPHONE ENCOUNTER
----- Message from Peyton Naylor MA sent at 1/8/2024 10:36 AM CST -----  Type: Needs Medical Advice  Who Called:  pt mom   Best Call Back Number: [phone] or [phone]  Additional Information: please advise mother of pt need codes for out of pocket cost for surgery

## 2024-02-01 ENCOUNTER — OFFICE VISIT (OUTPATIENT)
Dept: SURGERY | Facility: CLINIC | Age: 29
End: 2024-02-01
Payer: COMMERCIAL

## 2024-02-01 VITALS
DIASTOLIC BLOOD PRESSURE: 69 MMHG | HEART RATE: 67 BPM | HEIGHT: 65 IN | WEIGHT: 170 LBS | TEMPERATURE: 97 F | SYSTOLIC BLOOD PRESSURE: 117 MMHG | BODY MASS INDEX: 28.32 KG/M2

## 2024-02-01 DIAGNOSIS — K64.8 HEMORRHOID PROLAPSE: Primary | ICD-10-CM

## 2024-02-01 PROCEDURE — 99499 UNLISTED E&M SERVICE: CPT | Mod: S$GLB,,, | Performed by: SURGERY

## 2024-02-01 PROCEDURE — 46221 LIGATION OF HEMORRHOID(S): CPT | Mod: S$GLB,,, | Performed by: SURGERY

## 2024-02-01 PROCEDURE — 99999 PR PBB SHADOW E&M-EST. PATIENT-LVL III: CPT | Mod: PBBFAC,,, | Performed by: SURGERY

## 2024-02-01 NOTE — PROGRESS NOTES
Patient returns secondary to hemorrhoid prolapse.  Continues to have some occasional bleeding and prolapse.  He desires to have banding.          Having received informed consent pt was placed in prone jackknife position. I then placed rubber bands on the L lateral column, R Ant and R post columns without event. Bleeding was minimal and pt tolerated the procedure well.  Pt will RTC in 6 weeks

## 2024-04-16 ENCOUNTER — OFFICE VISIT (OUTPATIENT)
Dept: URGENT CARE | Facility: CLINIC | Age: 29
End: 2024-04-16
Payer: COMMERCIAL

## 2024-04-16 VITALS
OXYGEN SATURATION: 97 % | BODY MASS INDEX: 26.29 KG/M2 | TEMPERATURE: 98 F | SYSTOLIC BLOOD PRESSURE: 114 MMHG | HEART RATE: 83 BPM | WEIGHT: 158 LBS | RESPIRATION RATE: 16 BRPM | DIASTOLIC BLOOD PRESSURE: 69 MMHG

## 2024-04-16 DIAGNOSIS — R09.81 SINUS CONGESTION: ICD-10-CM

## 2024-04-16 DIAGNOSIS — B96.89 ACUTE BACTERIAL SINUSITIS: Primary | ICD-10-CM

## 2024-04-16 DIAGNOSIS — J01.90 ACUTE BACTERIAL SINUSITIS: Primary | ICD-10-CM

## 2024-04-16 PROCEDURE — 99214 OFFICE O/P EST MOD 30 MIN: CPT | Mod: S$GLB,,,

## 2024-04-16 RX ORDER — FLUTICASONE PROPIONATE 50 MCG
1 SPRAY, SUSPENSION (ML) NASAL DAILY
Qty: 15.8 ML | Refills: 0 | Status: SHIPPED | OUTPATIENT
Start: 2024-04-16

## 2024-04-16 RX ORDER — FLUTICASONE PROPIONATE 50 MCG
SPRAY, SUSPENSION (ML) NASAL
Qty: 48 G | OUTPATIENT
Start: 2024-04-16

## 2024-04-16 RX ORDER — AMOXICILLIN AND CLAVULANATE POTASSIUM 875; 125 MG/1; MG/1
1 TABLET, FILM COATED ORAL EVERY 12 HOURS
Qty: 10 TABLET | Refills: 0 | Status: SHIPPED | OUTPATIENT
Start: 2024-04-16 | End: 2024-04-21

## 2024-04-16 RX ORDER — LEVOCETIRIZINE DIHYDROCHLORIDE 5 MG/1
5 TABLET, FILM COATED ORAL NIGHTLY
Qty: 30 TABLET | Refills: 0 | Status: SHIPPED | OUTPATIENT
Start: 2024-04-16 | End: 2024-04-20

## 2024-04-16 NOTE — PROGRESS NOTES
Subjective:      Patient ID: Yunior Crawford is a 28 y.o. male.    Vitals:  weight is 71.7 kg (158 lb). His oral temperature is 98.3 °F (36.8 °C). His blood pressure is 114/69 and his pulse is 83. His respiration is 16 and oxygen saturation is 97%.     Chief Complaint: Sinusitis    Patient reports symptoms began about 1-2 weeks ago.  He did notice improvement initially which has since worsen with left-sided facial pain that comes and goes.  In left ear pain.  Denies fever.    Sinusitis  This is a new problem. The current episode started 1 to 4 weeks ago. The problem is unchanged. There has been no fever. His pain is at a severity of 0/10. He is experiencing no pain. Associated symptoms include chills, congestion, coughing, ear pain, headaches and sinus pressure. Treatments tried: pt has taken Coricidin. The treatment provided no relief.       Constitution: Positive for chills and fatigue. Negative for fever.   HENT:  Positive for ear pain, congestion, sinus pain and sinus pressure.    Neck: neck negative.   Cardiovascular: Negative.    Respiratory:  Positive for cough.    Gastrointestinal: Negative.    Musculoskeletal: Negative.    Skin: Negative.    Neurological:  Positive for headaches.   Psychiatric/Behavioral: Negative.        Objective:     Physical Exam   Constitutional: He is oriented to person, place, and time. He appears well-developed. He is cooperative.  Non-toxic appearance. He does not appear ill. No distress.   HENT:   Head: Normocephalic and atraumatic.   Ears:   Right Ear: Hearing, tympanic membrane, external ear and ear canal normal.   Left Ear: Hearing, tympanic membrane, external ear and ear canal normal.   Nose: Rhinorrhea and congestion present. No mucosal edema or nasal deformity. No epistaxis. Right sinus exhibits no maxillary sinus tenderness and no frontal sinus tenderness. Left sinus exhibits maxillary sinus tenderness. Left sinus exhibits no frontal sinus tenderness.   Mouth/Throat:  Uvula is midline and mucous membranes are normal. Mucous membranes are moist. No trismus in the jaw. Normal dentition. No uvula swelling. Posterior oropharyngeal erythema present. No oropharyngeal exudate or posterior oropharyngeal edema.   Eyes: Conjunctivae and lids are normal. No scleral icterus.   Neck: Trachea normal and phonation normal. Neck supple. No edema present. No erythema present. No neck rigidity present.   Cardiovascular: Normal rate, regular rhythm and normal heart sounds.   Pulmonary/Chest: Effort normal and breath sounds normal. No respiratory distress. He has no decreased breath sounds. He has no wheezes.   Abdominal: Normal appearance.   Musculoskeletal: Normal range of motion.         General: No deformity. Normal range of motion.   Neurological: He is alert and oriented to person, place, and time. He displays no weakness. He exhibits normal muscle tone.   Skin: Skin is warm, dry, intact, not diaphoretic and not pale.   Psychiatric: His speech is normal and behavior is normal. Judgment and thought content normal.   Nursing note and vitals reviewed.      Assessment:     1. Acute bacterial sinusitis    2. Sinus congestion        Plan:       Acute bacterial sinusitis  -     amoxicillin-clavulanate 875-125mg (AUGMENTIN) 875-125 mg per tablet; Take 1 tablet by mouth every 12 (twelve) hours. for 5 days  Dispense: 10 tablet; Refill: 0    Sinus congestion  -     fluticasone propionate (FLONASE) 50 mcg/actuation nasal spray; 1 spray (50 mcg total) by Each Nostril route once daily.  Dispense: 15.8 mL; Refill: 0  -     levocetirizine (XYZAL) 5 MG tablet; Take 1 tablet (5 mg total) by mouth every evening.  Dispense: 30 tablet; Refill: 0        Discussed medication with patient who acknowledges understanding and is agreeable to POC. Follow up with primary care. Increase fluid intake. Red flags for ER discussed.

## 2024-04-16 NOTE — PATIENT INSTRUCTIONS
Symptomatic treatment to include:     Rest, increase fluid intake to include electrolyte replacement  Ibuprofen/Tylenol as directed for fever, sore throat, body aches  Xyzal and flonase for sinus symptoms  Mucinex over the counter as directed for sinus congestion.   Saline nasal spray as desired for sinus congestion and dryness  Warm, salt water gargles, over the counter throat lozenges or sprays as desires.   ER for difficulty breathing not relieved by rest, excessive lethargy and/or change in mental status

## 2024-04-20 ENCOUNTER — OFFICE VISIT (OUTPATIENT)
Dept: URGENT CARE | Facility: CLINIC | Age: 29
End: 2024-04-20
Payer: COMMERCIAL

## 2024-04-20 VITALS
OXYGEN SATURATION: 98 % | TEMPERATURE: 99 F | BODY MASS INDEX: 26.33 KG/M2 | SYSTOLIC BLOOD PRESSURE: 110 MMHG | HEIGHT: 65 IN | RESPIRATION RATE: 20 BRPM | HEART RATE: 70 BPM | WEIGHT: 158 LBS | DIASTOLIC BLOOD PRESSURE: 71 MMHG

## 2024-04-20 DIAGNOSIS — J34.89 SINUS PRESSURE: ICD-10-CM

## 2024-04-20 DIAGNOSIS — B96.89 ACUTE BACTERIAL SINUSITIS: Primary | ICD-10-CM

## 2024-04-20 DIAGNOSIS — J01.90 ACUTE BACTERIAL SINUSITIS: Primary | ICD-10-CM

## 2024-04-20 PROCEDURE — 99213 OFFICE O/P EST LOW 20 MIN: CPT | Mod: S$GLB,,,

## 2024-04-20 NOTE — PROGRESS NOTES
"Subjective:      Patient ID: Yunior Crawford is a 28 y.o. male.    Vitals:  height is 5' 5" (1.651 m) and weight is 71.7 kg (158 lb). His temperature is 98.6 °F (37 °C). His blood pressure is 110/71 and his pulse is 70. His respiration is 20 and oxygen saturation is 98%.     Chief Complaint: Sinus Problem    Pt States was treated at another urgent care for this problem.Still having symptoms.  Reports he has only been taking his antibiotic once a day instead of twice a day and was unable to  his allergy pill Xyzal due to the pharmacy not feeling it.  He has been using Flonase as prescribed.  Discussed with patient I would recommend taking the antibiotic as prescribed twice a day and picking up an over-the-counter Zyrtec or Xyzal allergy pill along with some saline spray or sinus rinse.  He is still reports pressure on the left side of the sinuses.  Denies fever, chills, body aches.     Sinus Problem  The current episode started in the past 7 days. The problem is unchanged. Associated symptoms include congestion, headaches and sinus pressure. Pertinent negatives include no chills. Past treatments include antibiotics. The treatment provided mild relief.       Constitution: Negative for chills, fatigue and fever.   HENT:  Positive for congestion, sinus pain and sinus pressure.    Neck: neck negative.   Cardiovascular: Negative.    Respiratory: Negative.     Gastrointestinal: Negative.    Musculoskeletal: Negative.    Skin: Negative.    Neurological:  Positive for headaches.   Psychiatric/Behavioral: Negative.        Objective:     Physical Exam   Constitutional: He is oriented to person, place, and time. He appears well-developed. He is cooperative.  Non-toxic appearance. He does not appear ill. No distress.   HENT:   Head: Normocephalic and atraumatic.   Ears:   Right Ear: Hearing and external ear normal.   Left Ear: Hearing and external ear normal.   Nose: Rhinorrhea and congestion present. No mucosal edema " or nasal deformity. No epistaxis. Right sinus exhibits no maxillary sinus tenderness and no frontal sinus tenderness. Left sinus exhibits no maxillary sinus tenderness and no frontal sinus tenderness.   Mouth/Throat: Uvula is midline, oropharynx is clear and moist and mucous membranes are normal. Mucous membranes are moist. No trismus in the jaw. Normal dentition. No uvula swelling. No posterior oropharyngeal edema.   Eyes: Conjunctivae and lids are normal. No scleral icterus.   Neck: Trachea normal and phonation normal. Neck supple. No edema present. No erythema present. No neck rigidity present.   Cardiovascular: Normal rate.   Pulmonary/Chest: Effort normal. No respiratory distress. He has no decreased breath sounds.   Abdominal: Normal appearance.   Musculoskeletal: Normal range of motion.         General: No deformity. Normal range of motion.   Neurological: He is alert and oriented to person, place, and time. He displays no weakness. He exhibits normal muscle tone.   Skin: Skin is warm, dry, intact, not diaphoretic and not pale.   Psychiatric: His speech is normal and behavior is normal. Mood, judgment and thought content normal.   Nursing note and vitals reviewed.      Assessment:     1. Acute bacterial sinusitis    2. Sinus pressure        Plan:       Acute bacterial sinusitis    Sinus pressure      Continue medications as previously prescribed and as discussed above.    Follow up with primary care symptoms worsen or do not resolve.

## 2024-07-01 ENCOUNTER — TELEPHONE (OUTPATIENT)
Dept: FAMILY MEDICINE | Facility: CLINIC | Age: 29
End: 2024-07-01
Payer: COMMERCIAL

## 2024-07-01 NOTE — TELEPHONE ENCOUNTER
Pt not seen in over 4 years.     Spoke with pt mother. States he had hand, foot, and mouth disease. States he went to UC and was given a steroid.   He is now having testicle swelling.   Advised we haven't  seen pt in over 4 years. He will need to go to UC. No available appt today.

## 2024-07-01 NOTE — TELEPHONE ENCOUNTER
----- Message from Lizbeth Lugo sent at 7/1/2024  3:04 PM CDT -----  Contact: bharti  Mother Bharti calling about pt testicles being swollen mother thinks it is from a cortisone shot he received.   [phone]

## 2024-07-02 ENCOUNTER — TELEPHONE (OUTPATIENT)
Dept: SURGERY | Facility: CLINIC | Age: 29
End: 2024-07-02
Payer: COMMERCIAL

## 2024-07-02 ENCOUNTER — TELEPHONE (OUTPATIENT)
Dept: PSYCHIATRY | Facility: CLINIC | Age: 29
End: 2024-07-02
Payer: COMMERCIAL

## 2024-07-02 ENCOUNTER — PATIENT MESSAGE (OUTPATIENT)
Dept: PSYCHIATRY | Facility: CLINIC | Age: 29
End: 2024-07-02
Payer: COMMERCIAL

## 2024-07-02 NOTE — TELEPHONE ENCOUNTER
Pt's mom Bharti called requesting to schedule an appt. Advised her that we require a [facility]. Explained that we have an extensive wait list and it may be several months to be scheduled for the initial visit. She verbalized understanding. Resource list sent to pt via portal.

## 2024-07-02 NOTE — TELEPHONE ENCOUNTER
----- Message from Amanda Alfredo sent at 7/2/2024 12:48 PM CDT -----  Type: Needs Medical Advice  Who Called:  pt     Best Call Back Number: [phone] (home)     Additional Information: pt wants to know if he can get an external hernia removed today please advise .

## 2024-07-03 ENCOUNTER — TELEPHONE (OUTPATIENT)
Dept: SURGERY | Facility: CLINIC | Age: 29
End: 2024-07-03
Payer: COMMERCIAL

## 2024-07-03 NOTE — TELEPHONE ENCOUNTER
Fariba called the patient and he stated that he doesn't need an appointment at this time.  Carlyle

## 2024-07-03 NOTE — TELEPHONE ENCOUNTER
- Message from Amanda Alfredo sent at 7/2/2024 12:48 PM CDT -----  Type: Needs Medical Advice  Who Called:  pt      Best Call Back Number: [phone] (home)      Additional Information: pt wants to know if he can get an external hernia removed today please advise .

## 2024-07-17 ENCOUNTER — TELEPHONE (OUTPATIENT)
Dept: UROLOGY | Facility: CLINIC | Age: 29
End: 2024-07-17
Payer: COMMERCIAL

## 2024-07-17 NOTE — TELEPHONE ENCOUNTER
Preappt call and pt noted testicle pain and swelling since he was treated for hand/foot/mouth. Advised pt per department protocol testicle pain initial eval is with JAZMINE   Pt accepted new appt time for 10 am with NP.

## 2024-07-22 ENCOUNTER — OFFICE VISIT (OUTPATIENT)
Dept: UROLOGY | Facility: CLINIC | Age: 29
End: 2024-07-22
Payer: COMMERCIAL

## 2024-07-22 VITALS
DIASTOLIC BLOOD PRESSURE: 71 MMHG | WEIGHT: 162.5 LBS | HEIGHT: 69 IN | BODY MASS INDEX: 24.07 KG/M2 | HEART RATE: 70 BPM | SYSTOLIC BLOOD PRESSURE: 110 MMHG

## 2024-07-22 DIAGNOSIS — N50.811 PAIN IN RIGHT TESTICLE: Primary | ICD-10-CM

## 2024-07-22 PROCEDURE — 3078F DIAST BP <80 MM HG: CPT | Mod: CPTII,S$GLB,, | Performed by: NURSE PRACTITIONER

## 2024-07-22 PROCEDURE — 3074F SYST BP LT 130 MM HG: CPT | Mod: CPTII,S$GLB,, | Performed by: NURSE PRACTITIONER

## 2024-07-22 PROCEDURE — 1160F RVW MEDS BY RX/DR IN RCRD: CPT | Mod: CPTII,S$GLB,, | Performed by: NURSE PRACTITIONER

## 2024-07-22 PROCEDURE — 3008F BODY MASS INDEX DOCD: CPT | Mod: CPTII,S$GLB,, | Performed by: NURSE PRACTITIONER

## 2024-07-22 PROCEDURE — 99999 PR PBB SHADOW E&M-EST. PATIENT-LVL III: CPT | Mod: PBBFAC,,, | Performed by: NURSE PRACTITIONER

## 2024-07-22 PROCEDURE — 1159F MED LIST DOCD IN RCRD: CPT | Mod: CPTII,S$GLB,, | Performed by: NURSE PRACTITIONER

## 2024-07-22 PROCEDURE — 99203 OFFICE O/P NEW LOW 30 MIN: CPT | Mod: S$GLB,,, | Performed by: NURSE PRACTITIONER

## 2024-07-22 NOTE — PROGRESS NOTES
[facility] Note  Staff: EHSAN Mercado    PCP: N/A    Chief Complaint: Right sided Testicle pain    Subjective:        HPI: Yunior Crawford is a 28 y.o. male NEW PT presents today for evaluation of Right sided testicle pain that has now dissipated as of today's initial visit.    OV 7/22/24:  Pt states he had hand, foot, and mouth disease around 3 weeks ago.  With this he found his right testicle to be painful and swollen at the time.  As of today, the swelling and pain has gone away.  UA today-pt was unable to give sample upon arrival.  No gross hematuria noted today  No dysuria or problems with urination.    REVIEW OF SYSTEMS:  A comprehensive 10 system review was performed and is negative except as noted above in HPI    PMHx:  History reviewed. No pertinent past medical history.    PSHx:  Past Surgical History:   Procedure Laterality Date    CLAVICLE SURGERY      CLAVICLE SURGERY Left      Allergies:  Patient has no known allergies.    Medications: reviewed     Objective:     Vitals:    07/22/24 1003   BP: 110/71   Pulse: 70     General:WDWN in NAD  Eyes: PERRLA, normal conjunctiva  Respiratory: no increased work on breathing, clear to auscultation  Cardiovascular: regular rate and rhythm. No obvious extremity edema.  GI: palpation of masses. No tenderness. No hepatosplenomegaly to palpation.  Musculoskeletal: normal range of motion of bilateral upper extremities. Normal muscle strength and tone.  Skin: no obvious rashes or lesions. No tightening of skin noted.  Neurologic: CN grossly normal. Normal sensation.   Psychiatric: awake, alert and oriented x 3. Mood and affect normal. Cooperative.     Exam performed by me during OV today:  Pt deferred during ov today.  Assessment:       1. Pain in right testicle          Plan:     1.  Ultrasound of the Scrotum/Testicles offered upon conclusion of ov today.  Pt has chosen to monitor symptoms for now, if the pain and swelling returns,  then will contact our office to get orders for US.    2.  The following instructions were given to the patient to assist with discomfort as needed:  -Use jockstrap or the best supportive underwear he can get for relief of pressure.  -Alternate ice packs/heating pad to areas.  -Take OTC anti-inflammatories  -Sit in a warm tub of water greater than 15 minutes  -Elevate Scrotal Sac     F/UP as needed.    Luana Garnica, MARCUSP-C

## 2024-11-21 ENCOUNTER — OFFICE VISIT (OUTPATIENT)
Dept: FAMILY MEDICINE | Facility: CLINIC | Age: 29
End: 2024-11-21
Payer: COMMERCIAL

## 2024-11-21 VITALS
WEIGHT: 191 LBS | HEIGHT: 68 IN | OXYGEN SATURATION: 98 % | SYSTOLIC BLOOD PRESSURE: 100 MMHG | HEART RATE: 80 BPM | DIASTOLIC BLOOD PRESSURE: 60 MMHG | BODY MASS INDEX: 28.95 KG/M2

## 2024-11-21 DIAGNOSIS — Z00.00 WELLNESS EXAMINATION: Primary | ICD-10-CM

## 2024-11-21 DIAGNOSIS — M65.342 TRIGGER RING FINGER OF LEFT HAND: ICD-10-CM

## 2024-11-21 NOTE — PROGRESS NOTES
SUBJECTIVE:    Patient ID: Yunior Crawford is a 29 y.o. male.    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  He has been out of the  for the last 3 years.  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  He lives in hotels and motels quite a bit and eats plenty of restaurant and fast food.    He is not exercising much but will get on a treadmill and go to the gym and do some light weightlifting once a week.    He recently weaned off of cigarettes into vaping and weaned from vaping to nicotine pouches, he has given up to nicotine pouches 7 days ago.    He denies chest pain shortness a breath GI symptoms and  symptoms.    Has a left ring trigger finger that sticks occasionally    History of right hand 4th and 5th finger tendon laceration with the [place] knife accident / was repaired 10 to your 10 years ago.    Complains of occasional scalp rash and dandruff.        No visits with results within 6 Month(s) from this visit.   Latest known visit with results is:   Office Visit on 08/18/2023   Component Date Value Ref Range Status    SARS Coronavirus 2 Antigen 08/18/2023 Positive (A)  Negative Final     Acceptable 08/18/2023 Yes   Final    Rapid Influenza A Ag 08/18/2023 Negative  Negative Final    Rapid Influenza B Ag 08/18/2023 Negative  Negative Final     Acceptable 08/18/2023 Yes   Final       No past medical history on file.  Social History     Socioeconomic History    Marital status: Single   Tobacco Use    Smoking status: Former     Current packs/day: 0.50     Types: Cigarettes    Smokeless tobacco: Never   Substance and Sexual Activity    Alcohol use: Yes     Comment: weekends    Drug use: No     Social Drivers of Health     Financial Resource Strain: Low Risk   (11/21/2024)    Overall Financial Resource Strain (CARDIA)     Difficulty of Paying Living Expenses: Not hard at all   Food Insecurity: No Food Insecurity (11/21/2024)    Hunger Vital Sign     Worried About Running Out of Food in the Last Year: Never true     Ran Out of Food in the Last Year: Never true   Physical Activity: Inactive (11/21/2024)    Exercise Vital Sign     Days of Exercise per Week: 0 days     Minutes of Exercise per Session: 0 min   Stress: No Stress Concern Present (11/21/2024)    [employer] - Occupational Stress Questionnaire     Feeling of Stress : Not at all   Housing Stability: Unknown (11/21/2024)    Housing Stability Vital Sign     Unable to Pay for Housing in the Last Year: No     Past Surgical History:   Procedure Laterality Date    CLAVICLE SURGERY      CLAVICLE SURGERY Left      Family History   Adopted: Yes   Problem Relation Name Age of Onset    Psoriasis Neg Hx      Lupus Neg Hx      Suicidality Neg Hx      Eczema Neg Hx         The CVD Risk score (Rayostino, et al., 2008) failed to calculate for the following reasons:    Cannot find a previous HDL lab    Cannot find a previous total cholesterol lab    All of your core healthy metrics are met.      Review of patient's allergies indicates:  No Known Allergies  No current outpatient medications on file.    Review of Systems   Constitutional:  Negative for activity change and unexpected weight change.   HENT:  Negative for hearing loss, rhinorrhea and trouble swallowing.    Eyes:  Negative for discharge and visual disturbance.   Respiratory:  Negative for chest tightness and wheezing.    Cardiovascular:  Negative for chest pain and palpitations.   Gastrointestinal:  Negative for blood in stool, constipation, diarrhea and vomiting.   Endocrine: Negative for polydipsia and polyuria.   Genitourinary:  Negative for difficulty urinating, hematuria and urgency.   Musculoskeletal:  Negative for arthralgias, joint  "swelling and neck pain.   Neurological:  Negative for weakness and headaches.   Psychiatric/Behavioral:  Negative for confusion and dysphoric mood.            Objective:      Vitals:    11/21/24 1346   BP: 100/60   Pulse: 80   SpO2: 98%   Weight: 86.6 kg (191 lb)   Height: 5' 8" (1.727 m)     Physical Exam  Vitals and nursing note reviewed.   Constitutional:       General: He is not in acute distress.     Appearance: Normal appearance. He is well-developed. He is not toxic-appearing.   HENT:      Head: Normocephalic and atraumatic.      Right Ear: Tympanic membrane and external ear normal.      Left Ear: Tympanic membrane and external ear normal.      Nose: Nose normal.      Mouth/Throat:      Pharynx: Oropharynx is clear. No posterior oropharyngeal erythema.   Eyes:      Pupils: Pupils are equal, round, and reactive to light.   Neck:      Thyroid: No thyromegaly.      Vascular: No carotid bruit.   Cardiovascular:      Rate and Rhythm: Normal rate and regular rhythm.      Heart sounds: Normal heart sounds. No murmur heard.  Pulmonary:      Effort: Pulmonary effort is normal.      Breath sounds: Normal breath sounds. No wheezing or rales.   Abdominal:      General: Bowel sounds are normal. There is no distension.      Palpations: Abdomen is soft.      Tenderness: There is no abdominal tenderness.   Musculoskeletal:         General: No tenderness or deformity. Normal range of motion.      Cervical back: Normal range of motion and neck supple.      Lumbar back: Normal. No spasms.      Comments: Bends 90 degrees at  waist, shoulders and knees have full range of motion, no pitting edema to lower extremities   Lymphadenopathy:      Cervical: No cervical adenopathy.   Skin:     General: Skin is warm and dry.      Findings: No rash.   Neurological:      General: No focal deficit present.      Mental Status: He is alert and oriented to person, place, and time. Mental status is at baseline.      Cranial Nerves: No cranial " nerve deficit.      Coordination: Coordination normal.   Psychiatric:         Mood and Affect: Mood normal.         Behavior: Behavior normal.         Thought Content: Thought content normal.         Judgment: Judgment normal.           Assessment:       1. Wellness examination    2. Trigger ring finger of left hand         Plan:       Wellness examination  -     CBC Auto Differential; Future; Expected date: 11/21/2024  -     Comprehensive Metabolic Panel; Future; Expected date: 11/21/2024  -     Lipid Panel; Future; Expected date: 11/21/2024  -     TSH; Future; Expected date: 11/21/2024  -     Urinalysis Microscopic; Future; Expected date: 11/21/2024  -     Testosterone, Total, Males; Future; Expected date: 11/21/2024  Patient encouraged to increase exercise.  Reduce amount of fast food in the diet.  Wellness labs have been ordered.  Trigger ring finger of left hand  Discuss options of seeing orthopedics for a steroid injection when desired.    Follow up in about 1 year (around 11/21/2025).        11/21/2024 Stu Nazario

## 2024-11-22 LAB
ALBUMIN SERPL-MCNC: 4.6 G/DL (ref 3.6–5.1)
ALBUMIN/GLOB SERPL: 2 (CALC) (ref 1–2.5)
ALP SERPL-CCNC: 65 U/L (ref 36–130)
ALT SERPL-CCNC: 29 U/L (ref 9–46)
AST SERPL-CCNC: 30 U/L (ref 10–40)
BASOPHILS # BLD AUTO: 49 CELLS/UL (ref 0–200)
BASOPHILS NFR BLD AUTO: 0.6 %
BILIRUB SERPL-MCNC: 0.6 MG/DL (ref 0.2–1.2)
BUN SERPL-MCNC: 15 MG/DL (ref 7–25)
BUN/CREAT SERPL: NORMAL (CALC) (ref 6–22)
CALCIUM SERPL-MCNC: 9.4 MG/DL (ref 8.6–10.3)
CHLORIDE SERPL-SCNC: 102 MMOL/L (ref 98–110)
CHOLEST SERPL-MCNC: 242 MG/DL
CHOLEST/HDLC SERPL: 4.8 (CALC)
CO2 SERPL-SCNC: 28 MMOL/L (ref 20–32)
CREAT SERPL-MCNC: 0.97 MG/DL (ref 0.6–1.26)
EGFR: 107 ML/MIN/1.73M2
EOSINOPHIL # BLD AUTO: 213 CELLS/UL (ref 15–500)
EOSINOPHIL NFR BLD AUTO: 2.6 %
ERYTHROCYTE [DISTWIDTH] IN BLOOD BY AUTOMATED COUNT: 12.4 % (ref 11–15)
GLOBULIN SER CALC-MCNC: 2.3 G/DL (CALC) (ref 1.9–3.7)
GLUCOSE SERPL-MCNC: 85 MG/DL (ref 65–99)
HCT VFR BLD AUTO: 43.7 % (ref 38.5–50)
HDLC SERPL-MCNC: 50 MG/DL
HGB BLD-MCNC: 14.6 G/DL (ref 13.2–17.1)
LDLC SERPL CALC-MCNC: 138 MG/DL (CALC)
LYMPHOCYTES # BLD AUTO: 3067 CELLS/UL (ref 850–3900)
LYMPHOCYTES NFR BLD AUTO: 37.4 %
MCH RBC QN AUTO: 30.5 PG (ref 27–33)
MCHC RBC AUTO-ENTMCNC: 33.4 G/DL (ref 32–36)
MCV RBC AUTO: 91.2 FL (ref 80–100)
MONOCYTES # BLD AUTO: 705 CELLS/UL (ref 200–950)
MONOCYTES NFR BLD AUTO: 8.6 %
NEUTROPHILS # BLD AUTO: 4166 CELLS/UL (ref 1500–7800)
NEUTROPHILS NFR BLD AUTO: 50.8 %
NONHDLC SERPL-MCNC: 192 MG/DL (CALC)
PLATELET # BLD AUTO: 272 THOUSAND/UL (ref 140–400)
PMV BLD REES-ECKER: 9.7 FL (ref 7.5–12.5)
POTASSIUM SERPL-SCNC: 4.4 MMOL/L (ref 3.5–5.3)
PROT SERPL-MCNC: 6.9 G/DL (ref 6.1–8.1)
RBC # BLD AUTO: 4.79 MILLION/UL (ref 4.2–5.8)
SODIUM SERPL-SCNC: 138 MMOL/L (ref 135–146)
TESTOST SERPL-MCNC: 270 NG/DL (ref 250–827)
TRIGL SERPL-MCNC: 349 MG/DL
TSH SERPL-ACNC: 1.17 MIU/L (ref 0.4–4.5)
WBC # BLD AUTO: 8.2 THOUSAND/UL (ref 3.8–10.8)

## 2024-11-25 ENCOUNTER — TELEPHONE (OUTPATIENT)
Dept: FAMILY MEDICINE | Facility: CLINIC | Age: 29
End: 2024-11-25
Payer: COMMERCIAL

## 2024-11-25 NOTE — TELEPHONE ENCOUNTER
----- Message from Stu Nazario MD sent at 11/24/2024  7:06 PM CST -----  Call patient.  His cholesterol is elevated to 242 triglycerides high at 349 due to eating at restaurants and fast food too much.  CBC shows no anemia.  Sugar kidneys liver and thyroid all look normal.  Testosterone is at the low level of normal at 270.  If his job requires that he eats out of town frequent, then would recommend rosuvastatin 5 mg daily to control cholesterol . recheck CMP lipids again in six-months

## 2024-11-25 NOTE — TELEPHONE ENCOUNTER
Spoke to patient with results verbatim per Dr Nazario. Verbalized understanding on all. Said he will stop eating out so much as he does not want to take cholesterol medication right now. Remind me for 6 month lab.

## 2024-11-26 ENCOUNTER — TELEPHONE (OUTPATIENT)
Dept: FAMILY MEDICINE | Facility: CLINIC | Age: 29
End: 2024-11-26
Payer: COMMERCIAL

## 2024-11-26 NOTE — TELEPHONE ENCOUNTER
"Per Dr. Nazario "to boost testosterone restart exercise 3-4x a week. Can try an OTC testosterone boosting vitamin. Recheck testosterone level in 2 months."   "

## 2024-11-26 NOTE — TELEPHONE ENCOUNTER
Said he forgot to mention to Dr Nazario that a few months ago he got hand foot mouth disease and it affected his testicles which were swollen. Went to urologist and everything was fine. Wants to know if this would affect his low normal testosterone levels. Said he thinks his numbers should be higher and is concerned he may need to do something to address this asap.

## 2024-11-26 NOTE — TELEPHONE ENCOUNTER
----- Message from Taina sent at 11/26/2024  2:36 PM CST -----  Pt needs to speak with nurse concerning test results. Pt #[phone]

## 2024-11-27 NOTE — TELEPHONE ENCOUNTER
Spoke to patient with information per Dr Nazario. Remind me for lab. Said he wanted Dr Nazario to know that one of his testicles also atrophied. Still is concerned. Offered appt to discuss. Said whatever Dr Nazario thinks. Please do not send back to [facility]. I leave early today

## 2024-12-12 ENCOUNTER — OFFICE VISIT (OUTPATIENT)
Dept: UROLOGY | Facility: CLINIC | Age: 29
End: 2024-12-12
Payer: COMMERCIAL

## 2024-12-12 DIAGNOSIS — N50.9 TESTICLE TROUBLE: Primary | ICD-10-CM

## 2024-12-12 DIAGNOSIS — N50.811 PAIN IN RIGHT TESTICLE: ICD-10-CM

## 2024-12-12 PROCEDURE — 99999 PR PBB SHADOW E&M-EST. PATIENT-LVL III: CPT | Mod: PBBFAC,,, | Performed by: NURSE PRACTITIONER

## 2024-12-12 PROCEDURE — 99214 OFFICE O/P EST MOD 30 MIN: CPT | Mod: S$GLB,,, | Performed by: NURSE PRACTITIONER

## 2024-12-12 PROCEDURE — 1160F RVW MEDS BY RX/DR IN RCRD: CPT | Mod: CPTII,S$GLB,, | Performed by: NURSE PRACTITIONER

## 2024-12-12 PROCEDURE — 1159F MED LIST DOCD IN RCRD: CPT | Mod: CPTII,S$GLB,, | Performed by: NURSE PRACTITIONER

## 2024-12-12 NOTE — PROGRESS NOTES
"[facility] Note  Staff: MANNY MercadoC    PCP: RODO Nazario    Chief Complaint: Right Testicle shrinkage, Right testicle discomfort.    Subjective:        HPI: Yunior Crawford is a 29 y.o. male presents today for evaluation of ongoing, intermittent right testicle area discomfort at this time.  Pt also believes his Right Testicle has decreased in size at this time.    Pt is Established Pt to our office.  Last OV 7/22/24 for Testicle pain.    OV 7/22/24:  Pt c/o Right sided testicle pain that had improved during day of initial visit.  Pt states he had hand, foot, and mouth disease around 3 weeks ago.  With this he found his right testicle to be painful and swollen at the time.  As of today, the swelling and pain has gone away.  UA today-pt was unable to give sample upon arrival.  No gross hematuria noted today  No dysuria or problems with urination.  Pt deferred US for further evaluation during this appt.    12/12/24:  Pt returns with c/o "Right testicle shrinkage" and discomfort.  No changes to urination habits.  No gross hematuria  Pt recently had Testosterone checked and was "low"  (But was also done in the afternoon and not early morning)    PMHx:  History reviewed. No pertinent past medical history.    PSHx:  Past Surgical History:   Procedure Laterality Date    CLAVICLE SURGERY      CLAVICLE SURGERY Left      Allergies:  Patient has no known allergies.    Medications: reviewed     Objective:   There were no vitals filed for this visit.    General:WDWN in NAD  Eyes: PERRLA, normal conjunctiva  Respiratory: no increased work on breathing, clear to auscultation  Cardiovascular: regular rate and rhythm. No obvious extremity edema.  GI: palpation of masses. No tenderness. No hepatosplenomegaly to palpation.  Musculoskeletal: normal range of motion of bilateral upper extremities. Normal muscle strength and tone.  Skin: no obvious rashes or lesions. No tightening of skin " noted.  Neurologic: CN grossly normal. Normal sensation.   Psychiatric: awake, alert and oriented x 3. Mood and affect normal. Cooperative.     Exam performed by me during OV today:  No scrotal rashes, cysts or lesions  Right Testicle/Epididymis normal in size, no tenderness, no masses palpated.  Left Testicle/Epididymis:  normal and size, equal size bilaterally, no masses  Urethral meatus normal without discharge  Penis is circumcised    Assessment:       1. Testicle trouble    2. Pain in right testicle          Plan:     1.  Ultrasound of the Scrotum/Testicles to be scheduled for further evaluation at this time.    2.  The following instructions were given to the patient to assist with discomfort:  -Use jockstrap or the best supportive underwear he can get for relief of pressure.  -Alternate ice packs/heating pad to areas.  -Take OTC anti-inflammatories  -Sit in a warm tub of water greater than 15 minutes  -Elevate Scrotal Sac     F/u We will contact pt once we receive and review imaging results for further plan of care.      Luana Garnica, MANNYC

## 2024-12-21 ENCOUNTER — HOSPITAL ENCOUNTER (OUTPATIENT)
Dept: RADIOLOGY | Facility: HOSPITAL | Age: 29
Discharge: HOME | End: 2024-12-21
Attending: NURSE PRACTITIONER
Payer: COMMERCIAL

## 2024-12-21 DIAGNOSIS — N50.9 TESTICLE TROUBLE: ICD-10-CM

## 2024-12-21 PROCEDURE — 76870 US EXAM SCROTUM: CPT | Mod: TC

## 2024-12-21 PROCEDURE — 76870 US EXAM SCROTUM: CPT | Mod: 26,,, | Performed by: RADIOLOGY

## 2024-12-30 ENCOUNTER — TELEPHONE (OUTPATIENT)
Dept: FAMILY MEDICINE | Facility: CLINIC | Age: 29
End: 2024-12-30
Payer: COMMERCIAL

## 2024-12-30 PROBLEM — R68.82 DECREASED LIBIDO: Status: ACTIVE | Noted: 2024-12-30

## 2024-12-30 NOTE — TELEPHONE ENCOUNTER
[facility] requesting additional code for testosterone. Only wellness code used. He is not on any medication. Is there another code we could try? [facility] lab. Result was low level of normal

## 2025-01-29 ENCOUNTER — TELEPHONE (OUTPATIENT)
Dept: FAMILY MEDICINE | Facility: CLINIC | Age: 30
End: 2025-01-29
Payer: COMMERCIAL

## 2025-01-29 DIAGNOSIS — R79.89 LOW TESTOSTERONE: Primary | ICD-10-CM

## 2025-01-29 NOTE — TELEPHONE ENCOUNTER
"----- Message from Tech Luana sent at 1/29/2025  8:45 AM CST -----  Regarding: FW: Lab due    ----- Message -----  From: Tiffanie Barnett LPN  Sent: 1/23/2025  12:45 PM CST  To: Stu Nazario Staff  Subject: FW: Lab due                                        ----- Message -----  From: Luana Montague,   Sent: 1/21/2025  12:00 AM CST  To: RT Nick  Subject: Lab due                                          Per Dr. Nazario "to boost testosterone restart exercise 3-4x a week. Can try an OTC testosterone boosting vitamin. Recheck testosterone level in 2 months."  "

## 2025-01-29 NOTE — TELEPHONE ENCOUNTER
Spoke to patient that testosterone lab is due and he does not need to fast. Order pended. Updated remind me.

## 2025-01-30 LAB — TESTOST SERPL-MCNC: 663 NG/DL (ref 250–827)

## 2025-02-03 ENCOUNTER — TELEPHONE (OUTPATIENT)
Dept: FAMILY MEDICINE | Facility: CLINIC | Age: 30
End: 2025-02-03
Payer: COMMERCIAL

## 2025-02-03 NOTE — TELEPHONE ENCOUNTER
----- Message from Stu Nazario MD sent at 2/2/2025  7:22 PM CST -----  Call patient.  His testosterone level has now risen back up to 663 which is in the upper range of normal.  Continue current vitamins

## 2025-02-03 NOTE — PROGRESS NOTES
Call patient.  His testosterone level has now risen back up to 663 which is in the upper range of normal.  Continue current vitamins

## 2025-02-13 ENCOUNTER — OFFICE VISIT (OUTPATIENT)
Dept: FAMILY MEDICINE | Facility: CLINIC | Age: 30
End: 2025-02-13
Payer: COMMERCIAL

## 2025-02-13 VITALS
OXYGEN SATURATION: 98 % | SYSTOLIC BLOOD PRESSURE: 110 MMHG | DIASTOLIC BLOOD PRESSURE: 76 MMHG | WEIGHT: 196 LBS | HEART RATE: 70 BPM | BODY MASS INDEX: 29.7 KG/M2 | HEIGHT: 68 IN

## 2025-02-13 DIAGNOSIS — N45.3 EPIDIDYMO-ORCHITIS: ICD-10-CM

## 2025-02-13 DIAGNOSIS — E78.2 MIXED HYPERLIPIDEMIA: Primary | ICD-10-CM

## 2025-02-13 PROCEDURE — 3074F SYST BP LT 130 MM HG: CPT | Mod: CPTII,S$GLB,, | Performed by: FAMILY MEDICINE

## 2025-02-13 PROCEDURE — 99213 OFFICE O/P EST LOW 20 MIN: CPT | Mod: S$GLB,,, | Performed by: FAMILY MEDICINE

## 2025-02-13 PROCEDURE — 3078F DIAST BP <80 MM HG: CPT | Mod: CPTII,S$GLB,, | Performed by: FAMILY MEDICINE

## 2025-02-13 PROCEDURE — 3008F BODY MASS INDEX DOCD: CPT | Mod: CPTII,S$GLB,, | Performed by: FAMILY MEDICINE

## 2025-02-13 PROCEDURE — 1159F MED LIST DOCD IN RCRD: CPT | Mod: CPTII,S$GLB,, | Performed by: FAMILY MEDICINE

## 2025-02-13 NOTE — PROGRESS NOTES
SUBJECTIVE:    Patient ID: Yunior Crawford is a 29 y.o. male.    Chief Complaint: Testicle Check (Right testicle problem for months, no medicine, abc )    HPI    History of Present Illness    CHIEF COMPLAINT:  Yunior presents today for concerns about testicular changes    TESTICULAR SYMPTOMS:  He reports right testicular symptoms following hand, foot and mouth disease infection. Initially experienced swelling and pain which resolved after a few days. He notes persistent tenderness and altered sensation in the right testicle, with subjective perception of slight atrophy compared to the left side. He denies current pain. Ultrasound was normal.    MEDICATIONS:  He takes an OTC testosterone supplement inconsistently.    SOCIAL HISTORY:  He quit nicotine over 90 days ago after previously using various forms including smoking, dipping, and nicotine pouches. He uses caffeine occasionally but reports it makes him feel unwell.      ROS:  Constitutional: -appetite change, -chills, -fatigue, -fever, -unexpected weight change  HENT: -ear pain, -trouble swallowing  Eyes: -pain, -discharge, -visual disturbance  Respiratory: -apnea, -cough, -shortness of breath, -wheezing  Cardiovascular: -chest pain, -leg swelling  Gastrointestinal: -abdominal pain, -blood in stool, -constipation, -diarrhea, -nausea, -vomiting, -reflux  Endocrine: -cold intolerance, -heat intolerance, -polydipsia  Genitourinary: -bladder incontinence, -dysuria, -erectile dysfunction, -frequency, -hematuria, -testicular pain, -urgency, -nocturia  Musculoskeletal: -gait problem, -joint swelling, -myalgia, -joint pain  Neurological: -dizziness, -seizures, -numbness  Psychiatric/Behavioral: -agitation, -hallucinations, -nervous, -anxiety symptoms         Telephone on 01/29/2025   Component Date Value Ref Range Status    TESTOSTERONE, TOTAL, MALE 01/29/2025 663  250 - 827 ng/dL Final   Office Visit on 11/21/2024   Component Date Value Ref Range Status    WBC  11/21/2024 8.2  3.8 - 10.8 Thousand/uL Final    RBC 11/21/2024 4.79  4.20 - 5.80 Million/uL Final    Hemoglobin 11/21/2024 14.6  13.2 - 17.1 g/dL Final    Hematocrit 11/21/2024 43.7  38.5 - 50.0 % Final    MCV 11/21/2024 91.2  80.0 - 100.0 fL Final    MCH 11/21/2024 30.5  27.0 - 33.0 pg Final    MCHC 11/21/2024 33.4  32.0 - 36.0 g/dL Final    RDW 11/21/2024 12.4  11.0 - 15.0 % Final    Platelets 11/21/2024 272  140 - 400 Thousand/uL Final    MPV 11/21/2024 9.7  7.5 - 12.5 fL Final    Neutrophils, Abs 11/21/2024 4,166  1,500 - 7,800 cells/uL Final    Lymph # 11/21/2024 3,067  850 - 3,900 cells/uL Final    Mono # 11/21/2024 705  200 - 950 cells/uL Final    Eos # 11/21/2024 213  15 - 500 cells/uL Final    Baso # 11/21/2024 49  0 - 200 cells/uL Final    Neutrophils Relative 11/21/2024 50.8  % Final    Lymph % 11/21/2024 37.4  % Final    Mono % 11/21/2024 8.6  % Final    Eosinophil % 11/21/2024 2.6  % Final    Basophil % 11/21/2024 0.6  % Final    Glucose 11/21/2024 85  65 - 99 mg/dL Final    BUN 11/21/2024 15  7 - 25 mg/dL Final    Creatinine 11/21/2024 0.97  0.60 - 1.26 mg/dL Final    eGFR 11/21/2024 107  > OR = 60 mL/min/1.73m2 Final    BUN/Creatinine Ratio 11/21/2024 SEE NOTE:  6 - 22 (calc) Final    Sodium 11/21/2024 138  135 - 146 mmol/L Final    Potassium 11/21/2024 4.4  3.5 - 5.3 mmol/L Final    Chloride 11/21/2024 102  98 - 110 mmol/L Final    CO2 11/21/2024 28  20 - 32 mmol/L Final    Calcium 11/21/2024 9.4  8.6 - 10.3 mg/dL Final    Total Protein 11/21/2024 6.9  6.1 - 8.1 g/dL Final    Albumin 11/21/2024 4.6  3.6 - 5.1 g/dL Final    Globulin, Total 11/21/2024 2.3  1.9 - 3.7 g/dL (calc) Final    Albumin/Globulin Ratio 11/21/2024 2.0  1.0 - 2.5 (calc) Final    Total Bilirubin 11/21/2024 0.6  0.2 - 1.2 mg/dL Final    Alkaline Phosphatase 11/21/2024 65  36 - 130 U/L Final    AST 11/21/2024 30  10 - 40 U/L Final    ALT 11/21/2024 29  9 - 46 U/L Final    Cholesterol 11/21/2024 242 (H)  <200 mg/dL Final    HDL  11/21/2024 50  > OR = 40 mg/dL Final    Triglycerides 11/21/2024 349 (H)  <150 mg/dL Final    LDL Cholesterol 11/21/2024 138 (H)  mg/dL (calc) Final    HDL/Cholesterol Ratio 11/21/2024 4.8  <5.0 (calc) Final    Non HDL Chol. (LDL+VLDL) 11/21/2024 192 (H)  <130 mg/dL (calc) Final    TSH 11/21/2024 1.17  0.40 - 4.50 mIU/L Final    TESTOSTERONE, TOTAL, MALE 11/21/2024 270  250 - 827 ng/dL Final       No past medical history on file.  Social History     Socioeconomic History    Marital status: Single   Tobacco Use    Smoking status: Former     Current packs/day: 0.50     Types: Cigarettes    Smokeless tobacco: Never   Substance and Sexual Activity    Alcohol use: Yes     Comment: weekends    Drug use: No     Social Drivers of Health     Financial Resource Strain: Low Risk  (11/21/2024)    Overall Financial Resource Strain (CARDIA)     Difficulty of Paying Living Expenses: Not hard at all   Food Insecurity: No Food Insecurity (11/21/2024)    Hunger Vital Sign     Worried About Running Out of Food in the Last Year: Never true     Ran Out of Food in the Last Year: Never true   Physical Activity: Inactive (11/21/2024)    Exercise Vital Sign     Days of Exercise per Week: 0 days     Minutes of Exercise per Session: 0 min   Stress: No Stress Concern Present (11/21/2024)    [employer] - Occupational Stress Questionnaire     Feeling of Stress : Not at all   Housing Stability: Unknown (11/21/2024)    Housing Stability Vital Sign     Unable to Pay for Housing in the Last Year: No     Past Surgical History:   Procedure Laterality Date    CLAVICLE SURGERY      CLAVICLE SURGERY Left      Family History   Adopted: Yes   Problem Relation Name Age of Onset    Psoriasis Neg Hx      Lupus Neg Hx      Suicidality Neg Hx      Eczema Neg Hx         The 10-year CVD risk score (MEHNAZ'Agostino, et al., 2008) is: 1.8%    Values used to calculate the score:      Age: 29 years      Sex: Male      Diabetic: No       "Tobacco smoker: No      Systolic Blood Pressure: 110 mmHg      Is BP treated: No      HDL Cholesterol: 50 mg/dL      Total Cholesterol: 242 mg/dL    All of your core healthy metrics are met.      Review of patient's allergies indicates:  No Known Allergies  No current outpatient medications on file.    Review of Systems   Constitutional:  Negative for appetite change, chills, fatigue, fever and unexpected weight change.   HENT:  Negative for ear pain and trouble swallowing.    Eyes:  Negative for pain, discharge and visual disturbance.   Respiratory:  Negative for apnea, cough, shortness of breath and wheezing.    Cardiovascular:  Negative for chest pain and leg swelling.   Gastrointestinal:  Negative for abdominal pain, blood in stool, constipation, diarrhea, nausea, vomiting and reflux.   Endocrine: Negative for cold intolerance, heat intolerance and polydipsia.   Genitourinary:  Negative for bladder incontinence, dysuria, erectile dysfunction, frequency, hematuria, testicular pain and urgency.   Musculoskeletal:  Negative for gait problem, joint swelling and myalgias.   Neurological:  Negative for dizziness, seizures and numbness.   Psychiatric/Behavioral:  Negative for agitation, behavioral problems and hallucinations. The patient is not nervous/anxious.            Objective:      Vitals:    02/13/25 1016   BP: 110/76   Pulse: 70   SpO2: 98%   Weight: 88.9 kg (196 lb)   Height: 5' 8" (1.727 m)     Physical Exam  Vitals and nursing note reviewed.   Constitutional:       General: He is not in acute distress.     Appearance: Normal appearance. He is well-developed. He is not toxic-appearing.   HENT:      Head: Normocephalic and atraumatic.      Right Ear: Tympanic membrane and external ear normal.      Left Ear: Tympanic membrane and external ear normal.      Nose: Nose normal.      Mouth/Throat:      Pharynx: Oropharynx is clear. No posterior oropharyngeal erythema.   Eyes:      Pupils: Pupils are equal, round, " and reactive to light.   Neck:      Thyroid: No thyromegaly.      Vascular: No carotid bruit.   Cardiovascular:      Rate and Rhythm: Normal rate and regular rhythm.      Heart sounds: Normal heart sounds. No murmur heard.  Pulmonary:      Effort: Pulmonary effort is normal.      Breath sounds: Normal breath sounds. No wheezing or rales.   Abdominal:      General: Bowel sounds are normal. There is no distension.      Palpations: Abdomen is soft.      Tenderness: There is no abdominal tenderness.   Genitourinary:     Comments: Testicles are normal in size, epididymis palpable bilaterally are normal in size.  No masses or cysts palpable.  Musculoskeletal:         General: No tenderness or deformity. Normal range of motion.      Cervical back: Normal range of motion and neck supple.      Lumbar back: Normal. No spasms.      Comments: Bends 90 degrees at  waist, shoulders and knees have full range of motion, no pitting edema to lower extremities   Lymphadenopathy:      Cervical: No cervical adenopathy.   Skin:     General: Skin is warm and dry.      Findings: No rash.   Neurological:      General: No focal deficit present.      Mental Status: He is alert and oriented to person, place, and time. Mental status is at baseline.      Cranial Nerves: No cranial nerve deficit.      Coordination: Coordination normal.   Psychiatric:         Mood and Affect: Mood normal.         Behavior: Behavior normal.         Thought Content: Thought content normal.         Judgment: Judgment normal.       Physical Exam    Male Genitourinary: Epididymis not tender. Testicle in range of normal.             Assessment:       1. Mixed hyperlipidemia    2. Epididymo-orchitis         Plan:       Mixed hyperlipidemia  Cholesterol elevated to 242 HDL 50  , needs low-fat diet and exercise.  Recheck lipids again in six-months  Epididymo-orchitis  Had some viral epididymo-orchitis recently.  Testicles are now return returned to normal  size.  Testosterone level was in the 600s which  is in the normal range.  Continue testosterone boosting vitamins over-the-counter    Assessment & Plan    IMPRESSION:  - Reviewed patient's recent testosterone levels, which appear normal  - Assessed testicular concern, finding normal size and function despite patient's perception of changes  - Examined testicles and epididymis, finding no signs of infection or significant atrophy  - Considered recent history of hand, foot, and mouth disease as potential cause of temporary testicular swelling and discomfort  - Evaluated new skin delores on scrotum, likely a bruise or burst capillary unrelated to testicular concerns  - Determined overall testicular health and function to be within normal limits    RESOLVED EPIDIDYMITIS WITH RESIDUAL SYMPTOMS:  - Noted the patient's history of swollen testicle and epididymitis, which has resolved but left residual symptoms.  - Performed physical exam of the testicles and epididymis, finding no current pain or infection.  - Assessed the condition based on physical exam and previous test results.  - Recommend no specific treatment as the condition has resolved and current exam shows no active infection or significant abnormality.  - Instructed the patient to report any changes in symptoms or recurrence of pain.  - Evaluated the patient's report of unusual sensations in the testicle and surrounding area following a previous infection.  - Performed physical exam and found no significant abnormalities or pain response.  - Assessed that the sensations are likely residual effects from the previous infection and not indicative of a current problem.  - Recommend no specific treatment as the condition is not causing significant issues and physical exam is normal.  - Advised the patient to monitor and report any changes in sensation or new symptoms.    HAND, FOOT, AND MOUTH DISEASE:  - Noted the patient's history of hand, foot, and mouth disease with  associated testicular swelling.  - Acknowledged the potential connection between this history and the current testicular symptoms.  - Documented this information in the patient's medical record for future reference.  - Documented the patient's history of hand, foot, and mouth disease.  - Acknowledged the potential connection between this history and the current testicular symptoms.  - Noted this information for consideration in future diagnoses and treatment plans.    SMOKING CESSATION:  - Commended the patient for quitting nicotine use approximately 90 days ago.  - Confirmed no current use of nicotine products.  - Educated the patient about the health risks associated with smoking and the benefits of cessation.  - Encouraged the patient to continue abstaining from nicotine use.  - Offered support resources for maintaining smoking cessation if needed.    WEIGHT MANAGEMENT:  - Noted the patient's report of weight gain since quitting nicotine.  - Recommend implementing an exercise regimen, specifically suggesting a goal of 20 minutes of cardio daily.  - Advised on healthy dietary habits to manage weight gain.  - Scheduled a follow-up visit to monitor weight progress.    REDUCED EXERCISE CAPACITY:  - Addressed the patient's report of reduced exercise capacity compared to previous abilities.  - Recommend gradually increasing exercise duration to improve stamina.  - Suggested starting with low-impact exercises and progressively increasing intensity.  - Advised the patient to keep a log of exercise activities and any improvements in stamina.  - Yunior to work towards 20 minutes of continuous cardio on the treadmill as a fitness goal.    TESTICULAR HEALTH EDUCATION:  - Explained the dual function of testicles: testosterone production and sperm production.  - Discussed how the ana  dy can compensate if one testicle is damaged, with the other taking over function.  - Educated on the potential for testicular damage in certain  sports and the possibility of testicular torsion.  - Performed physical exam and found no significant abnormalities or current infection.  - Assessed that the current condition is likely residual effects from the previous infection and not indicative of a current problem.    MEDICATIONS/SUPPLEMENTS:  - Recommend continuing OTC testosterone supplement, noting inconsistent use has not significantly impacted testosterone levels.    FOLLOW UP:  - Advised the patient to report any changes in testicle size, sensation, or new symptoms.  - Scheduled a follow-up visit to reassess the condition and effectiveness of the testosterone supplement.         Follow up in about 6 months (around 8/13/2025), or Hyperlipidemia.        This note was generated with the assistance of ambient listening technology. Verbal consent was obtained by the patient and accompanying visitor(s) for the recording of patient appointment to facilitate this note. I attest to having reviewed and edited the generated note for accuracy, though some syntax or spelling errors may persist. Please contact the author of this note for any clarification.      2/13/2025 Stu Nazario

## 2025-02-25 ENCOUNTER — TELEPHONE (OUTPATIENT)
Dept: FAMILY MEDICINE | Facility: CLINIC | Age: 30
End: 2025-02-25
Payer: COMMERCIAL

## 2025-03-20 NOTE — PROGRESS NOTES
Subjective:       Patient ID: Yunior Crawford is a 24 y.o. male.    Chief Complaint: Consult (Prolapsed hemorrhoid)    HPI  Pleasant 25 yo M referrred to me in consultation from Dr Cardozo for evaluation of hemorrhoids.  Pt notes that he has issues with his hemorrhoids.  He notes that in recent months he has been having increasing difficulty with his hemorrhoids.  He notes that he now feels tissue prolapse with nearly every bowel movemen.  He also describes having bleeding with increasing frequency.  Bleeding only with Bm.  Feels pressure but no pain.  He has no other medical problems.  He denies any significant PShx.  He is adopted and thus unaware of his family history  Review of Systems   Constitutional: Negative for activity change, appetite change, diaphoresis, fever and unexpected weight change.   Respiratory: Negative for cough, chest tightness and wheezing.    Cardiovascular: Negative for chest pain and palpitations.   Gastrointestinal: Positive for anal bleeding. Negative for abdominal distention, abdominal pain, blood in stool, constipation, diarrhea, nausea, rectal pain and vomiting.   Genitourinary: Negative for difficulty urinating, dysuria and hematuria.   Musculoskeletal: Negative for back pain and gait problem.   Skin: Negative for color change and wound.   Neurological: Negative for tremors and seizures.   Hematological: Negative for adenopathy. Does not bruise/bleed easily.   Psychiatric/Behavioral: Negative for agitation.       Objective:      Physical Exam   Constitutional: He is oriented to person, place, and time. He appears well-developed and well-nourished.   HENT:   Head: Normocephalic and atraumatic.   Eyes: Pupils are equal, round, and reactive to light.   Neck: Normal range of motion. No tracheal deviation present. No thyromegaly present.   Cardiovascular: Normal rate, regular rhythm and normal heart sounds. Exam reveals no gallop and no friction rub.   No murmur  heard.  Pulmonary/Chest: Effort normal and breath sounds normal. He has no wheezes. He exhibits no tenderness.   Abdominal: He exhibits no distension and no mass. There is no tenderness. There is no rebound and no guarding. No hernia.   Genitourinary:   Genitourinary Comments: Ext exam demonstrates no significant ext hemorrhoids.  Luis Alfredo with normal sphincter tone.  Anoscopy demonstrates large int hemorrhoids in the R ant, R post and L lateral position   Musculoskeletal: Normal range of motion. He exhibits no edema, tenderness or deformity.   Lymphadenopathy:     He has no cervical adenopathy.   Neurological: He is alert and oriented to person, place, and time. Coordination normal.   Skin: Skin is warm. No rash noted. No erythema. No pallor.   Psychiatric: He has a normal mood and affect. His behavior is normal.   Vitals reviewed.      Assessment:     Hemorrhoid prolapse  No diagnosis found.    Plan:       D/w pt.  I have explained to pt that I suspect his hemorrhoid prolapse is reason for his bleeding and pressure. I have recommended fiber.  I have also d/w him possibility of banding and its role. He notes he desires to have banding today.  Risks and benefits of this were d/w pt and he desires to proceed.     Having received informed consent pt was placed in prone jackknife position. I then placed rubber bands on the L lateral column, R Ant and R post columns without event. Bleeding was minimal and pt tolerated the procedure well.    Pt notes he will call to schedule f/u in 6 weeks      Detail Level: Detailed Quality 431: Preventive Care And Screening: Unhealthy Alcohol Use - Screening: Patient not identified as an unhealthy alcohol user when screened for unhealthy alcohol use using a systematic screening method Quality 226: Preventive Care And Screening: Tobacco Use: Screening And Cessation Intervention: Patient screened for tobacco use and is an ex/non-smoker

## 2025-05-14 ENCOUNTER — TELEPHONE (OUTPATIENT)
Dept: FAMILY MEDICINE | Facility: CLINIC | Age: 30
End: 2025-05-14
Payer: COMMERCIAL

## 2025-05-14 DIAGNOSIS — E78.2 MIXED HYPERLIPIDEMIA: Primary | ICD-10-CM

## 2025-05-14 NOTE — TELEPHONE ENCOUNTER
Left mess to call me back, comm consent not filled out. Only sent lipid order. I have note that patient did not start med.

## 2025-05-14 NOTE — TELEPHONE ENCOUNTER
----- Message from Tray Craft sent at 11/25/2024  2:54 PM CST -----  Regarding: Lab due-did not start cholesterol med    ----- Message from Stu Nazario MD sent at 11/24/2024  7:06 PM CST -----  Call patient.  His cholesterol is elevated to 242 triglycerides high at 349 due to eating at restaurants and fast food too much.  CBC shows no anemia.  Sugar kidneys liver and thyroid all look normal.  Testosterone is at the low level of normal at 270.  If his job requires that he eats out of town frequent, then would recommend rosuvastatin 5 mg daily to control cholesterol . recheck CMP lipids again in six-months

## 2025-05-28 ENCOUNTER — TELEPHONE (OUTPATIENT)
Dept: FAMILY MEDICINE | Facility: CLINIC | Age: 30
End: 2025-05-28
Payer: COMMERCIAL

## 2025-05-28 NOTE — TELEPHONE ENCOUNTER
----- Message from Tray Craft sent at 11/25/2024  2:54 PM CST -----  Regarding: Lab due-did not start cholesterol med    ----- Message from tSu Nazario MD sent at 11/24/2024  7:06 PM CST -----  Call patient.  His cholesterol is elevated to 242 triglycerides high at 349 due to eating at restaurants and fast food too much.  CBC shows no anemia.  Sugar kidneys liver and thyroid all look normal.  Testosterone is at the low level of normal at 270.  If his job requires that he eats out of town frequent, then would recommend rosuvastatin 5 mg daily to control cholesterol . recheck CMP lipids again in six-months

## 2025-06-11 ENCOUNTER — TELEPHONE (OUTPATIENT)
Dept: FAMILY MEDICINE | Facility: CLINIC | Age: 30
End: 2025-06-11
Payer: COMMERCIAL

## 2025-06-11 NOTE — TELEPHONE ENCOUNTER
Left mess that fasting lab is due to recheck cholesterol, order at [facility], gave fasting instructions. 3 attempts made. Can I sign reminder?

## 2025-07-31 ENCOUNTER — TELEPHONE (OUTPATIENT)
Dept: FAMILY MEDICINE | Facility: CLINIC | Age: 30
End: 2025-07-31
Payer: COMMERCIAL

## 2025-09-03 ENCOUNTER — E-VISIT (OUTPATIENT)
Dept: FAMILY MEDICINE | Facility: CLINIC | Age: 30
End: 2025-09-03
Payer: COMMERCIAL

## 2025-09-03 DIAGNOSIS — K64.5 HEMORRHOID THROMBOSIS: Primary | ICD-10-CM

## 2025-09-04 ENCOUNTER — OFFICE VISIT (OUTPATIENT)
Dept: SURGERY | Facility: CLINIC | Age: 30
End: 2025-09-04
Payer: COMMERCIAL

## 2025-09-04 ENCOUNTER — TELEPHONE (OUTPATIENT)
Dept: FAMILY MEDICINE | Facility: CLINIC | Age: 30
End: 2025-09-04
Payer: COMMERCIAL

## 2025-09-04 VITALS
WEIGHT: 179.25 LBS | SYSTOLIC BLOOD PRESSURE: 117 MMHG | HEART RATE: 72 BPM | DIASTOLIC BLOOD PRESSURE: 70 MMHG | BODY MASS INDEX: 27.25 KG/M2

## 2025-09-04 DIAGNOSIS — K64.5 THROMBOSED EXTERNAL HEMORRHOID: Primary | ICD-10-CM

## 2025-09-04 DIAGNOSIS — K64.5 THROMBOSED EXTERNAL HEMORRHOIDS: Primary | ICD-10-CM

## 2025-09-04 PROCEDURE — 99999 PR PBB SHADOW E&M-EST. PATIENT-LVL III: CPT | Mod: PBBFAC,,, | Performed by: SURGERY

## 2025-09-05 PROBLEM — K64.5 HEMORRHOID THROMBOSIS: Status: ACTIVE | Noted: 2025-09-05

## (undated) DEVICE — IMMOBILIZER HAND ALUMINUM LRG

## (undated) DEVICE — BANDAGE ELASTIC 2X5 VELCRO ST

## (undated) DEVICE — BLADE SCALP OPHTL RND TIP

## (undated) DEVICE — TOURNIQUET SB QC SP 18X4IN

## (undated) DEVICE — SEE MEDLINE ITEM 157131

## (undated) DEVICE — GLOVE SURG BIOGEL LATEX SZ 7.5

## (undated) DEVICE — ALCOHOL 70% ISOP W/GREEN 16OZ

## (undated) DEVICE — NDL HYPO REG 25G X 1 1/2

## (undated) DEVICE — DRESSING XEROFORM FOIL PK 1X8

## (undated) DEVICE — SUT PROLENE 6-0 30 RB-2

## (undated) DEVICE — SUT ETHILON 5-0 PS-2 18IN

## (undated) DEVICE — PAD CAST SPECIALIST STRL 3

## (undated) DEVICE — SYR B-D DISP CONTROL 10CC100/C

## (undated) DEVICE — SPONGE DERMACEA GAUZE 4X4

## (undated) DEVICE — SPLINT PLASTER FAST SET 5X30IN

## (undated) DEVICE — SEE MEDLINE ITEM 146347

## (undated) DEVICE — SUT ETHILON 8-0 BLK MONO BV

## (undated) DEVICE — SYR BULB 60CC IRRIGATION

## (undated) DEVICE — DRESSING GZ SURGICOUNT 4X8

## (undated) DEVICE — GAUZE SPONGE 4X4 12PLY

## (undated) DEVICE — PAD CAST 2 IN X 4YDS STERILE

## (undated) DEVICE — PAD UNDERPAD 30X30

## (undated) DEVICE — SLING ARM LARGE FOAM STRAP

## (undated) DEVICE — BANDAGE ELASTIC 3X5 VELCRO ST

## (undated) DEVICE — BLADE SURG STAINLESS STEEL #15

## (undated) DEVICE — ELECTRODE REM PLYHSV RETURN 9

## (undated) DEVICE — SEE MEDLINE ITEM 146308

## (undated) DEVICE — SUT BLU BR 4-0 W/DMD PT 3/8

## (undated) DEVICE — SOL 9P NACL IRR PIC IL

## (undated) DEVICE — SUT ET GRN BR 3-0 CR 36 SH1

## (undated) DEVICE — SPLINT PLASTER F.S 4INX15IN